# Patient Record
Sex: MALE | Race: WHITE | NOT HISPANIC OR LATINO | Employment: OTHER | URBAN - METROPOLITAN AREA
[De-identification: names, ages, dates, MRNs, and addresses within clinical notes are randomized per-mention and may not be internally consistent; named-entity substitution may affect disease eponyms.]

---

## 2017-09-28 ENCOUNTER — TELEPHONE (OUTPATIENT)
Dept: NEUROLOGY | Facility: CLINIC | Age: 77
End: 2017-09-28

## 2017-09-28 NOTE — TELEPHONE ENCOUNTER
----- Message from Theodora Knutson sent at 9/28/2017 12:54 PM CDT -----  Contact: Alberta (wife) @ [phone]  They saw Dr Arvizu at a support group at [facility] and Dr Arvizu suggested he come see him in clinic.  Would like to schedule a NP appt but there is nothing available.  pls call with an appt.

## 2017-09-28 NOTE — TELEPHONE ENCOUNTER
Appt accepted with Dr. Burgess during [facility] res clinic on 10/24/17 @ 2:30pm. Pt was seen by  in [facility] General support group and was advised to come to the clinic.

## 2017-10-24 ENCOUNTER — OFFICE VISIT (OUTPATIENT)
Dept: NEUROLOGY | Facility: CLINIC | Age: 77
End: 2017-10-24
Payer: MEDICARE

## 2017-10-24 VITALS
WEIGHT: 231 LBS | DIASTOLIC BLOOD PRESSURE: 68 MMHG | HEART RATE: 63 BPM | SYSTOLIC BLOOD PRESSURE: 113 MMHG | HEIGHT: 68 IN | BODY MASS INDEX: 35.01 KG/M2

## 2017-10-24 DIAGNOSIS — G20.A1 PARKINSON DISEASE: ICD-10-CM

## 2017-10-24 PROCEDURE — 99999 PR PBB SHADOW E&M-EST. PATIENT-LVL III: CPT | Mod: PBBFAC,GC,, | Performed by: PSYCHIATRY & NEUROLOGY

## 2017-10-24 PROCEDURE — 99213 OFFICE O/P EST LOW 20 MIN: CPT | Mod: PBBFAC | Performed by: PSYCHIATRY & NEUROLOGY

## 2017-10-24 PROCEDURE — 99205 OFFICE O/P NEW HI 60 MIN: CPT | Mod: S$PBB,GC,, | Performed by: PSYCHIATRY & NEUROLOGY

## 2017-10-24 RX ORDER — TAMSULOSIN HYDROCHLORIDE 0.4 MG/1
0.4 CAPSULE ORAL 2 TIMES DAILY
COMMUNITY

## 2017-10-24 RX ORDER — FINASTERIDE 5 MG/1
5 TABLET, FILM COATED ORAL NIGHTLY
COMMUNITY

## 2017-10-24 RX ORDER — RIVASTIGMINE TARTRATE 4.5 MG/1
4.5 CAPSULE ORAL 2 TIMES DAILY
Qty: 60 CAPSULE | Refills: 5 | Status: SHIPPED | OUTPATIENT
Start: 2017-10-24 | End: 2018-04-24 | Stop reason: SDUPTHER

## 2017-10-24 RX ORDER — RIVASTIGMINE TARTRATE 4.5 MG/1
4.5 CAPSULE ORAL 2 TIMES DAILY
COMMUNITY
End: 2017-10-24 | Stop reason: SDUPTHER

## 2017-10-24 RX ORDER — METOPROLOL SUCCINATE 25 MG/1
25 TABLET, EXTENDED RELEASE ORAL DAILY
COMMUNITY

## 2017-10-24 RX ORDER — OXYCODONE HCL 10 MG/1
10 TABLET, FILM COATED, EXTENDED RELEASE ORAL EVERY 12 HOURS PRN
COMMUNITY
End: 2018-03-08

## 2017-10-24 RX ORDER — LISINOPRIL 2.5 MG/1
2.5 TABLET ORAL DAILY
COMMUNITY
End: 2019-12-16 | Stop reason: ALTCHOICE

## 2017-10-24 RX ORDER — ALLOPURINOL 300 MG/1
300 TABLET ORAL DAILY
COMMUNITY
End: 2018-03-08 | Stop reason: SDUPTHER

## 2017-10-24 RX ORDER — OMEPRAZOLE 20 MG/1
20 CAPSULE, DELAYED RELEASE ORAL DAILY
COMMUNITY

## 2017-10-24 RX ORDER — ASPIRIN 81 MG/1
81 TABLET ORAL DAILY
COMMUNITY

## 2017-10-24 RX ORDER — SIMVASTATIN 80 MG/1
80 TABLET, FILM COATED ORAL NIGHTLY
COMMUNITY

## 2017-10-24 RX ORDER — VENLAFAXINE 75 MG/1
75 TABLET ORAL DAILY
COMMUNITY
End: 2017-10-24 | Stop reason: SDUPTHER

## 2017-10-24 RX ORDER — CARBIDOPA AND LEVODOPA 25; 100 MG/1; MG/1
1 TABLET ORAL 3 TIMES DAILY
COMMUNITY
End: 2017-10-24

## 2017-10-24 RX ORDER — ISOSORBIDE MONONITRATE 30 MG/1
30 TABLET, EXTENDED RELEASE ORAL 2 TIMES DAILY
COMMUNITY

## 2017-10-24 RX ORDER — CARBIDOPA, LEVODOPA AND ENTACAPONE 25; 200; 100 MG/1; MG/1; MG/1
1 TABLET, FILM COATED ORAL 3 TIMES DAILY
Qty: 90 TABLET | Refills: 11 | Status: SHIPPED | OUTPATIENT
Start: 2017-10-24 | End: 2018-06-08 | Stop reason: DRUGHIGH

## 2017-10-24 RX ORDER — OXYBUTYNIN CHLORIDE 10 MG/1
10 TABLET, EXTENDED RELEASE ORAL NIGHTLY
COMMUNITY
End: 2018-03-08

## 2017-10-24 RX ORDER — METFORMIN HYDROCHLORIDE 500 MG/1
1000 TABLET ORAL NIGHTLY
COMMUNITY
End: 2018-03-08

## 2017-10-24 RX ORDER — MEMANTINE HYDROCHLORIDE 10 MG/1
5 TABLET ORAL 2 TIMES DAILY
COMMUNITY
End: 2018-01-24 | Stop reason: SDUPTHER

## 2017-10-24 RX ORDER — CLOPIDOGREL BISULFATE 75 MG/1
75 TABLET ORAL DAILY
COMMUNITY
End: 2018-03-08

## 2017-10-24 RX ORDER — VENLAFAXINE 75 MG/1
75 TABLET ORAL DAILY
Qty: 30 TABLET | Refills: 5 | Status: SHIPPED | OUTPATIENT
Start: 2017-10-24 | End: 2018-04-24 | Stop reason: SDUPTHER

## 2017-10-24 NOTE — PROGRESS NOTES
"Patient Name: Danelle Frankel  MRN: [id number]    CC: PD mgmt    HPI: Danelle Frankel is a 77 y.o. male with PMHx of PD (CD/LD) diagnosed 8 years ago, DM, HTN, CAD s/p CABG 20 yrs ago, urinary incontinence (ditropan), peripheral neuropathy (effexor), presents to movement disorders clinic due to further management of parkinson's disease. Patient started with RUE resting tremor. The tremor has never really gotten worse since diagnosis. Patient was initially started on azilect 1 mg daily with CD/LD added on. Due to loss of health insurance, had to be switched to monotherapy with CD/LD. Neurologist tried going up on dose twice, but patient started hallucinating. Patient currently on CD/LD 25/100 TID for the past 4+ years. Patient currently on Neupro patch (rotigotine) for the past few years. Patient accompanied by wife who states that patient has been "slower". Patient has had a couple of falls- most recently last year. Patient has been having vivid visual hallucinations for the past 2 years. This is reported as once every few months. Patient also used to be on entacapone TID for the past few years (taken with CD/LD) up until last month when prices went up. Wife states he was doing better on entacapone.     Premotor sx's- +anosmia, + constipation, + REM behavior issues- acting out dreams, vivid dreams.     Patient of Dr. Arriaga.       ROS:   Review of Systems   Constitutional: Negative for malaise/fatigue. Negative for weight loss.   HENT: Negative for hearing loss.   Eyes: Negative for blurred vision and double vision.   Respiratory: Negative for shortness of breath and stridor.   Cardiovascular: Negative for chest pain and palpitations.   Gastrointestinal: Negative for nausea, vomiting and constipation.   Genitourinary: Negative for frequency. Negative for urgency.   Musculoskeletal: Negative for joint pain. Negative for myalgias and falls.   Skin: Negative for rash.   Neurological: Negative for dizziness and + for " resting tremors. Negative for focal weakness and seizures.   Endo/Heme/Allergies: Does not bruise/bleed easily.   Psychiatric/Behavioral: Negative for memory loss. Negative for depression and hallucinations. The patient is not nervous/anxious.      Past Medical History  No past medical history on file.    Medications    Current Outpatient Prescriptions:     allopurinol (ZYLOPRIM) 300 MG tablet, Take 300 mg by mouth once daily., Disp: , Rfl:     aspirin (ECOTRIN) 81 MG EC tablet, Take 81 mg by mouth once daily., Disp: , Rfl:     carbidopa-levodopa  mg (SINEMET)  mg per tablet, Take 1 tablet by mouth 3 (three) times daily., Disp: , Rfl:     clopidogrel (PLAVIX) 75 mg tablet, Take 75 mg by mouth once daily., Disp: , Rfl:     finasteride (PROSCAR) 5 mg tablet, Take 5 mg by mouth once daily., Disp: , Rfl:     isosorbide mononitrate (IMDUR) 30 MG 24 hr tablet, Take 30 mg by mouth 2 (two) times daily., Disp: , Rfl:     lisinopril (PRINIVIL,ZESTRIL) 2.5 MG tablet, Take 2.5 mg by mouth once daily., Disp: , Rfl:     memantine (NAMENDA) 10 MG Tab, Take 5 mg by mouth 2 (two) times daily., Disp: , Rfl:     metFORMIN (GLUCOPHAGE) 500 MG tablet, Take 1,000 mg by mouth every evening., Disp: , Rfl:     metoprolol succinate (TOPROL-XL) 25 MG 24 hr tablet, Take 25 mg by mouth once daily., Disp: , Rfl:     omeprazole (PRILOSEC) 20 MG capsule, Take 20 mg by mouth once daily., Disp: , Rfl:     oxybutynin (DITROPAN-XL) 10 MG 24 hr tablet, Take 10 mg by mouth every evening., Disp: , Rfl:     oxyCODONE (OXYCONTIN) 10 mg 12 hr tablet, Take 10 mg by mouth every 12 (twelve) hours as needed for Pain., Disp: , Rfl:     rivastigmine tartrate 4.5 mg capsule, Take 4.5 mg by mouth 2 (two) times daily., Disp: , Rfl:     rotigotine (NEUPRO) 8 mg/24 hour PT24, Place onto the skin., Disp: , Rfl:     simvastatin (ZOCOR) 80 MG tablet, Take 80 mg by mouth every evening., Disp: , Rfl:     tamsulosin (FLOMAX) 0.4 mg Cp24, Take  "0.4 mg by mouth once daily., Disp: , Rfl:     venlafaxine (EFFEXOR) 75 MG tablet, Take 75 mg by mouth once daily., Disp: , Rfl:     Allergies  Review of patient's allergies indicates:  No Known Allergies    Social History  Social History     Social History    Marital status:      Spouse name: N/A    Number of children: N/A    Years of education: N/A     Occupational History    Not on file.     Social History Main Topics    Smoking status: Not on file    Smokeless tobacco: Not on file    Alcohol use Not on file    Drug use: Unknown    Sexual activity: Not on file     Other Topics Concern    Not on file     Social History Narrative    No narrative on file       Family History  No family history on file.    Physical Exam  /68 (BP Location: Left arm, Patient Position: Sitting, BP Method: X-Large (Automatic))   Pulse 63   Ht 5' 8" (1.727 m)   Wt 104.8 kg (231 lb)   BMI 35.12 kg/m²     General appearance: Well-developed, well-groomed. In wheelchair due to long walk into clinic.    Neurologic Exam: The patient is awake, alert and oriented. Language is fluent. Recent and remote memory are normal. Attention span and concentration are normal. Fund of knowledge is appropriate.     Cranial nerves: pupils are round and reactive to light and accommodation. Visual fields are full to confrontation. Ocular motility is full in all cardinal positions of gaze. Facial sensation is normal to pinprick and light touch. Corneal reflexes are present bilaterally. Facial activation is symmetric. Hearing is normal bilaterally. Palate elevates symmetrically and gag reflex is intact bilaterally. Shoulder elevation is symmetric and full strength bilaterally. Tongue is midline and neck rotation strength is normal bilaterally. Neck range of motion is normal.     Motor examination of all extremities demonstrates normal bulk and tone in all four limbs. There are no atrophy or fasciculations. Strength is 5/5 in the upper " and lower extremities bilaterally without pronator drift.   RUE resting tremor  Cogwheel rigidity in RUE>LUE  Masked facies  Decreased blink rate    Sensory examination is normal to pinprick, vibration and proprioception in the upper and lower extremities bilaterally. Romberg is negative.    Deep tendon reflexes are 2+ and symmetric in the upper and lower extremities bilaterally. Toes are mute bilaterally.     Gait: Decreased R arm swing with short choppy shuffling gait, multiple steps en bloc turn  +retropulsion    Coordination: Finger to nose and heel to shin testing is normal in both upper and lower extremities. Rapid alternating movements are normal in both upper and lower extremities.     General exam  Cardiovascular: regular rate and rhythm with no murmurs, rubs or gallops. There are no carotid or vertebral artery bruits. Pulses in both upper and lower extremities are symmetric. There is no peripheral edema.   Head and neck: no cervical lymphadenopathy            Lab and Test Results    No results found for: WBC, HGB, HCT, PLT  No results found for: GLU, NA, K, CL, CO2, BUN, CREATININE, CALCIUM, MG, PHOS  No results found for: ALB, ALKPHOS, ALT, AST      Images: none    Independently reviewed NO    Other Tests    Assessment and Plan    Danelle Frankel is a 77 y.o. male with 8 year history of PD- tremor predominant. Patient has had same severity of tremor throughout course of 8 years. Was on entacapone in the past , tremors less, recently had to stop taking it (1 month ago) due to increase in price, so tremor has increased. Will write patient for stalevo generic (CD/LD/entacapone). If price too high, will retry entacapone as separate medication to be taken with each dose of CD/LD as prior.     Continue Neupro patch at current dose  Stalevo 25/100/200 TID  Consider comptan separately with each dose of CD/LD as before if price of stalevo too high              Lisseth Burgess MD  Neurology Resident    [facility]  [address] [place] [address]  Pager: [phone]

## 2017-10-26 PROBLEM — G20.A1 PARKINSON DISEASE: Status: ACTIVE | Noted: 2017-10-26

## 2017-11-29 ENCOUNTER — PATIENT MESSAGE (OUTPATIENT)
Dept: NEUROLOGY | Facility: CLINIC | Age: 77
End: 2017-11-29

## 2018-01-24 ENCOUNTER — OFFICE VISIT (OUTPATIENT)
Dept: NEUROLOGY | Facility: CLINIC | Age: 78
End: 2018-01-24
Payer: MEDICARE

## 2018-01-24 ENCOUNTER — LAB VISIT (OUTPATIENT)
Dept: LAB | Facility: HOSPITAL | Age: 78
End: 2018-01-24
Payer: MEDICARE

## 2018-01-24 VITALS
WEIGHT: 229.5 LBS | HEIGHT: 68 IN | SYSTOLIC BLOOD PRESSURE: 122 MMHG | HEART RATE: 64 BPM | DIASTOLIC BLOOD PRESSURE: 71 MMHG | BODY MASS INDEX: 34.78 KG/M2

## 2018-01-24 DIAGNOSIS — R35.0 URINARY FREQUENCY: ICD-10-CM

## 2018-01-24 DIAGNOSIS — R44.3 HALLUCINATION: ICD-10-CM

## 2018-01-24 DIAGNOSIS — G20.A1 PARKINSON DISEASE: ICD-10-CM

## 2018-01-24 DIAGNOSIS — R44.3 HALLUCINATIONS: ICD-10-CM

## 2018-01-24 DIAGNOSIS — G20.A1 PARKINSON DISEASE: Primary | ICD-10-CM

## 2018-01-24 LAB
BACTERIA #/AREA URNS AUTO: NORMAL /HPF
BILIRUB UR QL STRIP: NEGATIVE
CLARITY UR REFRACT.AUTO: ABNORMAL
COLOR UR AUTO: ABNORMAL
GLUCOSE UR QL STRIP: ABNORMAL
HGB UR QL STRIP: NEGATIVE
HYALINE CASTS UR QL AUTO: 0 /LPF
KETONES UR QL STRIP: ABNORMAL
LEUKOCYTE ESTERASE UR QL STRIP: NEGATIVE
MICROSCOPIC COMMENT: NORMAL
NITRITE UR QL STRIP: NEGATIVE
PH UR STRIP: 5 [PH] (ref 5–8)
PROT UR QL STRIP: ABNORMAL
RBC #/AREA URNS AUTO: 1 /HPF (ref 0–4)
SP GR UR STRIP: 1.03 (ref 1–1.03)
URN SPEC COLLECT METH UR: ABNORMAL
UROBILINOGEN UR STRIP-ACNC: NEGATIVE EU/DL
WBC #/AREA URNS AUTO: 5 /HPF (ref 0–5)

## 2018-01-24 PROCEDURE — 99214 OFFICE O/P EST MOD 30 MIN: CPT | Mod: S$PBB,GC,, | Performed by: PSYCHIATRY & NEUROLOGY

## 2018-01-24 PROCEDURE — 81001 URINALYSIS AUTO W/SCOPE: CPT

## 2018-01-24 PROCEDURE — 99214 OFFICE O/P EST MOD 30 MIN: CPT | Mod: PBBFAC | Performed by: PSYCHIATRY & NEUROLOGY

## 2018-01-24 PROCEDURE — 99999 PR PBB SHADOW E&M-EST. PATIENT-LVL IV: CPT | Mod: PBBFAC,GC,, | Performed by: PSYCHIATRY & NEUROLOGY

## 2018-01-24 RX ORDER — MEMANTINE HYDROCHLORIDE 10 MG/1
10 TABLET ORAL 2 TIMES DAILY
Qty: 60 TABLET | Refills: 6 | Status: SHIPPED | OUTPATIENT
Start: 2018-01-24 | End: 2018-09-19 | Stop reason: SDUPTHER

## 2018-01-24 NOTE — PROGRESS NOTES
"Patient Name: Danelle Frankel  MRN: [id number]    CC: PD mgmt    Interval Hx- Patient having episodes of talking to someone else, possible hallucinations. Has happened twice. Happening once or twice a week. We had decreased noon dose of stalevo to half  A pill. Wife eliminated noon dose since December. Neupro patch- patient's wife decrease changing to every 3 days instead of every day. Last night he had an episode where he was laughing, looking down the galloway and seeing dead puppy. He has not had an episdoe like this since November.     HPI: Danelle Frankel is a 77 y.o. male with PMHx of PD (CD/LD) diagnosed 8 years ago, DM, HTN, CAD s/p CABG 20 yrs ago, urinary incontinence (ditropan), peripheral neuropathy (effexor), presents to movement disorders clinic due to further management of parkinson's disease. Patient started with RUE resting tremor. The tremor has never really gotten worse since diagnosis. Patient was initially started on azilect 1 mg daily with CD/LD added on. Due to loss of health insurance, had to be switched to monotherapy with CD/LD. Neurologist tried going up on dose twice, but patient started hallucinating. Patient currently on CD/LD 25/100 TID for the past 4+ years. Patient currently on Neupro patch (rotigotine) for the past few years. Patient accompanied by wife who states that patient has been "slower". Patient has had a couple of falls- most recently last year. Patient has been having vivid visual hallucinations for the past 2 years. This is reported as once every few months. Patient also used to be on entacapone TID for the past few years (taken with CD/LD) up until last month when prices went up. Wife states he was doing better on entacapone.     Premotor sx's- +anosmia, + constipation, + REM behavior issues- acting out dreams, vivid dreams.     Patient of Dr. Arriaga.       ROS:   Review of Systems   Constitutional: Negative for malaise/fatigue. Negative for weight loss.   HENT: Negative " for hearing loss.   Eyes: Negative for blurred vision and double vision.   Respiratory: Negative for shortness of breath and stridor.   Cardiovascular: Negative for chest pain and palpitations.   Gastrointestinal: Negative for nausea, vomiting and constipation.   Genitourinary: Negative for frequency. Negative for urgency.   Musculoskeletal: Negative for joint pain. Negative for myalgias and falls.   Skin: Negative for rash.   Neurological: Negative for dizziness and + for resting tremors. Negative for focal weakness and seizures.   Endo/Heme/Allergies: Does not bruise/bleed easily.   Psychiatric/Behavioral: Negative for memory loss. Negative for depression and hallucinations. The patient is not nervous/anxious.      Past Medical History  No past medical history on file.    Medications    Current Outpatient Prescriptions:     allopurinol (ZYLOPRIM) 300 MG tablet, Take 300 mg by mouth once daily., Disp: , Rfl:     aspirin (ECOTRIN) 81 MG EC tablet, Take 81 mg by mouth once daily., Disp: , Rfl:     carbidopa-levodopa-entacapone -200 mg (STALEVO) -200 mg Tab, Take 1 tablet by mouth 3 (three) times daily., Disp: 90 tablet, Rfl: 11    clopidogrel (PLAVIX) 75 mg tablet, Take 75 mg by mouth once daily., Disp: , Rfl:     finasteride (PROSCAR) 5 mg tablet, Take 5 mg by mouth once daily., Disp: , Rfl:     isosorbide mononitrate (IMDUR) 30 MG 24 hr tablet, Take 30 mg by mouth 2 (two) times daily., Disp: , Rfl:     lisinopril (PRINIVIL,ZESTRIL) 2.5 MG tablet, Take 2.5 mg by mouth once daily., Disp: , Rfl:     memantine (NAMENDA) 10 MG Tab, Take 5 mg by mouth 2 (two) times daily., Disp: , Rfl:     metFORMIN (GLUCOPHAGE) 500 MG tablet, Take 1,000 mg by mouth every evening., Disp: , Rfl:     metoprolol succinate (TOPROL-XL) 25 MG 24 hr tablet, Take 25 mg by mouth once daily., Disp: , Rfl:     omeprazole (PRILOSEC) 20 MG capsule, Take 20 mg by mouth once daily., Disp: , Rfl:     oxybutynin (DITROPAN-XL) 10  "MG 24 hr tablet, Take 10 mg by mouth every evening., Disp: , Rfl:     oxyCODONE (OXYCONTIN) 10 mg 12 hr tablet, Take 10 mg by mouth every 12 (twelve) hours as needed for Pain., Disp: , Rfl:     rivastigmine tartrate 4.5 mg capsule, Take 1 capsule (4.5 mg total) by mouth 2 (two) times daily., Disp: 60 capsule, Rfl: 5    rotigotine (NEUPRO) 8 mg/24 hour PT24, Place onto the skin., Disp: , Rfl:     simvastatin (ZOCOR) 80 MG tablet, Take 80 mg by mouth every evening., Disp: , Rfl:     tamsulosin (FLOMAX) 0.4 mg Cp24, Take 0.4 mg by mouth once daily., Disp: , Rfl:     venlafaxine (EFFEXOR) 75 MG tablet, Take 1 tablet (75 mg total) by mouth once daily., Disp: 30 tablet, Rfl: 5    Allergies  Review of patient's allergies indicates:  No Known Allergies    Social History  Social History     Social History    Marital status:      Spouse name: N/A    Number of children: N/A    Years of education: N/A     Occupational History    Not on file.     Social History Main Topics    Smoking status: Not on file    Smokeless tobacco: Not on file    Alcohol use Not on file    Drug use: Unknown    Sexual activity: Not on file     Other Topics Concern    Not on file     Social History Narrative    No narrative on file       Family History  No family history on file.    Physical Exam  /71   Pulse 64   Ht 5' 8" (1.727 m)   Wt 104.1 kg (229 lb 8 oz)   BMI 34.90 kg/m²     General appearance: Well-developed, well-groomed. In wheelchair due to long walk into clinic.    Neurologic Exam: The patient is awake, alert and oriented. Language is fluent. Recent and remote memory are normal. Attention span and concentration are normal. Fund of knowledge is appropriate.     Cranial nerves: pupils are round and reactive to light and accommodation. Visual fields are full to confrontation. Ocular motility is full in all cardinal positions of gaze. Facial sensation is normal to pinprick and light touch. Corneal reflexes are " present bilaterally. Facial activation is symmetric. Hearing is normal bilaterally. Palate elevates symmetrically and gag reflex is intact bilaterally. Shoulder elevation is symmetric and full strength bilaterally. Tongue is midline and neck rotation strength is normal bilaterally. Neck range of motion is normal.     Motor examination of all extremities demonstrates normal bulk and tone in all four limbs. There are no atrophy or fasciculations. Strength is 5/5 in the upper and lower extremities bilaterally without pronator drift.   RUE resting tremor  Cogwheel rigidity in RUE>LUE  Masked facies  Decreased blink rate    Sensory examination is normal to pinprick, vibration and proprioception in the upper and lower extremities bilaterally. Romberg is negative.    Deep tendon reflexes are 2+ and symmetric in the upper and lower extremities bilaterally. Toes are mute bilaterally.     Gait: Decreased R arm swing with short choppy shuffling gait, multiple steps en bloc turn  +retropulsion    Coordination: Finger to nose and heel to shin testing is normal in both upper and lower extremities. Rapid alternating movements are normal in both upper and lower extremities.     General exam  Cardiovascular: regular rate and rhythm with no murmurs, rubs or gallops. There are no carotid or vertebral artery bruits. Pulses in both upper and lower extremities are symmetric. There is no peripheral edema.   Head and neck: no cervical lymphadenopathy            Lab and Test Results    No results found for: WBC, HGB, HCT, PLT  No results found for: GLU, NA, K, CL, CO2, BUN, CREATININE, CALCIUM, MG, PHOS  No results found for: ALB, ALKPHOS, ALT, AST      Images: none    Independently reviewed NO    Other Tests    Assessment and Plan    Problem List Items Addressed This Visit        Neuro    Parkinson disease - Primary    Overview     Patient with Parkinson's on stalevo 25/100/200 TID and neupro patch for RLS. Started having visual  hallucinations in November, decreased stalevo to BID. Got much better. Had episode on night of 18 where he had VH and was talking to somebody who was not there. Also seeing  cat.         Current Assessment & Plan     Cut Neupro patch in half and use just like she is now- every 3 days  Stalevo 25/100/200 BID  Continue memantine and rivastigmine at current doses  Consider comptan separately with each dose of CD/LD as before if price of stalevo too high  Patient on ditropan (anticholinergic)- consider decreasing if hallucinations do not stop or increase.   R/o infection- CBC, U/A (normal, no indication of infectious process driving VH)           Relevant Orders    Urinalysis (Completed)    CULTURE, URINE    CBC auto differential (Completed)       Renal/    Urinary frequency    Current Assessment & Plan     U/A (no infection)  CBC         Relevant Medications    memantine (NAMENDA) 10 MG Tab    Other Relevant Orders    Urinalysis (Completed)    CULTURE, URINE       Other    Hallucinations    Current Assessment & Plan     R/O infection  Decrease dopamine agonist (neurpr patch in half q3 days)  Continue stalevo BID             Other Visit Diagnoses     Hallucination        Relevant Orders    Urinalysis (Completed)    CULTURE, URINE            RTC 6 weeks        Lisseth Burgess MD  Neurology Resident   [facility] [address] [place] [address]  Pager: [phone]

## 2018-01-25 PROBLEM — R35.0 URINARY FREQUENCY: Status: ACTIVE | Noted: 2018-01-25

## 2018-01-25 PROBLEM — R44.3 HALLUCINATIONS: Status: ACTIVE | Noted: 2018-01-25

## 2018-01-25 NOTE — ASSESSMENT & PLAN NOTE
Cut Neupro patch in half and use just like she is now- every 3 days  Stalevo 25/100/200 BID  Continue memantine and rivastigmine at current doses  Consider comptan separately with each dose of CD/LD as before if price of stalevo too high  Patient on ditropan (anticholinergic)- consider decreasing if hallucinations do not stop or increase.   R/o infection- CBC, U/A (normal, no indication of infectious process driving VH)

## 2018-03-08 PROBLEM — G89.29 CHRONIC LOW BACK PAIN WITHOUT SCIATICA: Status: ACTIVE | Noted: 2018-03-08

## 2018-03-08 PROBLEM — M54.50 CHRONIC LOW BACK PAIN WITHOUT SCIATICA: Status: ACTIVE | Noted: 2018-03-08

## 2018-03-13 ENCOUNTER — OFFICE VISIT (OUTPATIENT)
Dept: NEUROLOGY | Facility: CLINIC | Age: 78
End: 2018-03-13
Payer: MEDICARE

## 2018-03-13 VITALS — DIASTOLIC BLOOD PRESSURE: 71 MMHG | HEIGHT: 68 IN | HEART RATE: 62 BPM | SYSTOLIC BLOOD PRESSURE: 111 MMHG

## 2018-03-13 DIAGNOSIS — G20.A1 PARKINSON DISEASE: ICD-10-CM

## 2018-03-13 PROCEDURE — 99214 OFFICE O/P EST MOD 30 MIN: CPT | Mod: PBBFAC | Performed by: PSYCHIATRY & NEUROLOGY

## 2018-03-13 PROCEDURE — 99999 PR PBB SHADOW E&M-EST. PATIENT-LVL IV: CPT | Mod: PBBFAC,GC,, | Performed by: PSYCHIATRY & NEUROLOGY

## 2018-03-13 PROCEDURE — 99214 OFFICE O/P EST MOD 30 MIN: CPT | Mod: S$PBB,GC,, | Performed by: PSYCHIATRY & NEUROLOGY

## 2018-03-13 NOTE — PROGRESS NOTES
"Patient Name: Danelle Frankel  MRN: [id number]    CC: PD mgmt    Interval Hx- Patient accompanied by wife, states VH decreased. Increasingly bradykinetic and rigid. Has trouble getting up and repositioning. Feels like he is more rigid since eliminating afternoon dose of stalevo (VH).     Interval Hx- Patient having episodes of talking to someone else, possible hallucinations. Has happened twice. Happening once or twice a week. We had decreased noon dose of stalevo to half  A pill. Wife eliminated noon dose since December. Neupro patch- patient's wife decrease changing to every 3 days instead of every day. Last night he had an episode where he was laughing, looking down the galloway and seeing dead puppy. He has not had an episdoe like this since November.     HPI: Danelle Frankel is a 77 y.o. male with PMHx of PD (CD/LD) diagnosed 8 years ago, DM, HTN, CAD s/p CABG 20 yrs ago, urinary incontinence (ditropan), peripheral neuropathy (effexor), presents to movement disorders clinic due to further management of parkinson's disease. Patient started with RUE resting tremor. The tremor has never really gotten worse since diagnosis. Patient was initially started on azilect 1 mg daily with CD/LD added on. Due to loss of health insurance, had to be switched to monotherapy with CD/LD. Neurologist tried going up on dose twice, but patient started hallucinating. Patient currently on CD/LD 25/100 TID for the past 4+ years. Patient currently on Neupro patch (rotigotine) for the past few years. Patient accompanied by wife who states that patient has been "slower". Patient has had a couple of falls- most recently last year. Patient has been having vivid visual hallucinations for the past 2 years. This is reported as once every few months. Patient also used to be on entacapone TID for the past few years (taken with CD/LD) up until last month when prices went up. Wife states he was doing better on entacapone.     Premotor sx's- +anosmia, " + constipation, + REM behavior issues- acting out dreams, vivid dreams.     Patient of Dr. Arriaga.       ROS:   Review of Systems   Constitutional: Negative for malaise/fatigue. Negative for weight loss.   HENT: Negative for hearing loss.   Eyes: Negative for blurred vision and double vision.   Respiratory: Negative for shortness of breath and stridor.   Cardiovascular: Negative for chest pain and palpitations.   Gastrointestinal: Negative for nausea, vomiting and constipation.   Genitourinary: Negative for frequency. Negative for urgency.   Musculoskeletal: Negative for joint pain. Negative for myalgias and falls.   Skin: Negative for rash.   Neurological: Negative for dizziness and + for resting tremors. Negative for focal weakness and seizures.   Endo/Heme/Allergies: Does not bruise/bleed easily.   Psychiatric/Behavioral: Negative for memory loss. Negative for depression and hallucinations. The patient is not nervous/anxious.      Past Medical History  Past Medical History:   Diagnosis Date    Cataract     Coronary artery disease     Dementia     Diabetes mellitus, type 2     Dyslipidemia     GERD (gastroesophageal reflux disease)     Gout     Hyperlipidemia     Hypertension     Parkinson disease     Venous insufficiency        Medications    Current Outpatient Prescriptions:     allopurinol (ZYLOPRIM) 300 MG tablet, Take 1 tablet (300 mg total) by mouth once daily., Disp: 90 tablet, Rfl: 3    aspirin (ECOTRIN) 81 MG EC tablet, Take 81 mg by mouth once daily., Disp: , Rfl:     carbidopa-levodopa-entacapone -200 mg (STALEVO) -200 mg Tab, Take 1 tablet by mouth 3 (three) times daily., Disp: 90 tablet, Rfl: 11    finasteride (PROSCAR) 5 mg tablet, Take 5 mg by mouth once daily., Disp: , Rfl:     isosorbide mononitrate (IMDUR) 30 MG 24 hr tablet, Take 30 mg by mouth 2 (two) times daily., Disp: , Rfl:     lisinopril (PRINIVIL,ZESTRIL) 2.5 MG tablet, Take 2.5 mg by mouth once daily., Disp:  ", Rfl:     memantine (NAMENDA) 10 MG Tab, Take 1 tablet (10 mg total) by mouth 2 (two) times daily., Disp: 60 tablet, Rfl: 6    metFORMIN (GLUCOPHAGE-XR) 500 MG 24 hr tablet, Take 2 tablets by mouth once daily., Disp: , Rfl:     metoprolol succinate (TOPROL-XL) 25 MG 24 hr tablet, Take 25 mg by mouth once daily., Disp: , Rfl:     omeprazole (PRILOSEC) 20 MG capsule, Take 20 mg by mouth once daily., Disp: , Rfl:     rivastigmine tartrate 4.5 mg capsule, Take 1 capsule (4.5 mg total) by mouth 2 (two) times daily., Disp: 60 capsule, Rfl: 5    rotigotine (NEUPRO) 8 mg/24 hour PT24, Place 4 mg onto the skin every other day. Cut patch in half and use every other day to every 3 days, Disp: , Rfl:     simvastatin (ZOCOR) 80 MG tablet, Take 80 mg by mouth every evening., Disp: , Rfl:     tamsulosin (FLOMAX) 0.4 mg Cp24, Take 0.4 mg by mouth once daily., Disp: , Rfl:     tiZANidine (ZANAFLEX) 4 MG tablet, Take 1 tablet (4 mg total) by mouth every 6 (six) hours as needed., Disp: 30 tablet, Rfl: 0    venlafaxine (EFFEXOR) 75 MG tablet, Take 1 tablet (75 mg total) by mouth once daily., Disp: 30 tablet, Rfl: 5    Allergies  Review of patient's allergies indicates:  No Known Allergies    Social History  Social History     Social History    Marital status:      Spouse name: N/A    Number of children: 4    Years of education: N/A     Occupational History    Not on file.     Social History Main Topics    Smoking status: Never Smoker    Smokeless tobacco: Former User     Types: Chew    Alcohol use No    Drug use: No    Sexual activity: Not on file     Other Topics Concern    Not on file     Social History Narrative    No narrative on file       Family History  Family History   Problem Relation Age of Onset    Diabetes Mother     Cancer Mother     Cancer Father     Cancer Sister     Stroke Brother        Physical Exam  /71   Pulse 62   Ht 5' 8" (1.727 m)     General appearance: Well-developed, " well-groomed. In wheelchair due to long walk into clinic.    Neurologic Exam: The patient is awake, alert and oriented. Language is fluent. Recent and remote memory are normal. Attention span and concentration are normal. Fund of knowledge is appropriate.     Cranial nerves: pupils are round and reactive to light and accommodation. Visual fields are full to confrontation. Ocular motility is full in all cardinal positions of gaze. Facial sensation is normal to pinprick and light touch. Corneal reflexes are present bilaterally. Facial activation is symmetric. Hearing is normal bilaterally. Palate elevates symmetrically and gag reflex is intact bilaterally. Shoulder elevation is symmetric and full strength bilaterally. Tongue is midline and neck rotation strength is normal bilaterally. Neck range of motion is normal.     Motor examination of all extremities demonstrates normal bulk and tone in all four limbs. There are no atrophy or fasciculations. Strength is 5/5 in the upper and lower extremities bilaterally without pronator drift.   RUE resting tremor- increased since last visit  Cogwheel rigidity in RUE>LUE  Masked facies  Decreased blink rate    Sensory examination is normal to pinprick, vibration and proprioception in the upper and lower extremities bilaterally. Romberg is negative.    Deep tendon reflexes are 2+ and symmetric in the upper and lower extremities bilaterally. Toes are mute bilaterally.     Gait: Decreased R arm swing with short choppy shuffling gait, multiple steps en bloc turn  +retropulsion  Inc bradykinetic with choppy steps  Requires increased effort to get out of wheelchair    Coordination: Finger to nose and heel to shin testing is normal in both upper and lower extremities. Rapid alternating movements are normal in both upper and lower extremities.     General exam  Cardiovascular: regular rate and rhythm with no murmurs, rubs or gallops. There are no carotid or vertebral artery bruits.  Pulses in both upper and lower extremities are symmetric. There is no peripheral edema.   Head and neck: no cervical lymphadenopathy            Lab and Test Results    WBC   Date Value Ref Range Status   2018 10.95 3.90 - 12.70 K/uL Final     Hemoglobin   Date Value Ref Range Status   2018 12.8 (L) 14.0 - 18.0 g/dL Final     Hematocrit   Date Value Ref Range Status   2018 38.9 (L) 40.0 - 54.0 % Final     Platelets   Date Value Ref Range Status   2018 225 150 - 350 K/uL Final     No results found for: GLU, NA, K, CL, CO2, BUN, CREATININE, CALCIUM, MG, PHOS  No results found for: ALB, ALKPHOS, ALT, AST      Images: none    Independently reviewed NO    Other Tests    Assessment and Plan    Problem List Items Addressed This Visit     None        Problem List Items Addressed This Visit        Neuro    Parkinson disease    Overview     Patient with Parkinson's on stalevo 25/100/200 TID and neupro patch for RLS. Started having visual hallucinations in November, decreased stalevo to BID. Got much better. Had episode on night of 18 where he had VH and was talking to somebody who was not there. Also seeing  cat.         Current Assessment & Plan     Decrease Neurpro patch as currently using (1/2 patch) daily (2 mg)  Inc Stalevo 25/100/200 to TID  Continue memantine and rivastigmine at current doses  Consider comptan separately with each dose of CD/LD as before if price of stalevo too high  Patient recently had ditropan (anticholinergic) discontinued by other doctor.  PT                 RTC 2-4 weeks weeks        Lisseth Burgess MD  Neurology Resident   [facility] [address] [place] [address]  Pager: [phone]

## 2018-03-13 NOTE — ASSESSMENT & PLAN NOTE
Decrease Neurpro patch as currently using (1/2 patch) daily (2 mg)  Inc Stalevo 25/100/200 to TID  Continue memantine and rivastigmine at current doses  Consider comptan separately with each dose of CD/LD as before if price of stalevo too high  Patient recently had ditropan (anticholinergic) discontinued by other doctor.  PT

## 2018-04-12 RX ORDER — VENLAFAXINE 75 MG/1
75 TABLET ORAL DAILY
Qty: 30 TABLET | Refills: 5 | OUTPATIENT
Start: 2018-04-12

## 2018-04-24 RX ORDER — VENLAFAXINE 75 MG/1
75 TABLET ORAL DAILY
Qty: 30 TABLET | Refills: 1 | Status: SHIPPED | OUTPATIENT
Start: 2018-04-24 | End: 2018-06-29 | Stop reason: SDUPTHER

## 2018-04-24 RX ORDER — RIVASTIGMINE TARTRATE 4.5 MG/1
4.5 CAPSULE ORAL 2 TIMES DAILY
Qty: 60 CAPSULE | Refills: 5 | Status: SHIPPED | OUTPATIENT
Start: 2018-04-24 | End: 2018-09-19 | Stop reason: SDUPTHER

## 2018-04-24 NOTE — TELEPHONE ENCOUNTER
Rivastigmine was refilled. Dr. Burgess declined refill on the venlafaxine on 4/11/18, but it's unclear why. I will fill for 1 month, then defer back to Dr. Burgess.

## 2018-04-24 NOTE — TELEPHONE ENCOUNTER
----- Message from Tyshawn Simons sent at 4/23/2018 11:50 AM CDT -----  Contact: [facility] @ [phone]  [facility] is calling for refill on rivastigmine tartrate 4.5 mg capsule and venlafaxine (EFFEXOR) 75 MG tablet. Pls call.

## 2018-04-27 ENCOUNTER — PATIENT MESSAGE (OUTPATIENT)
Dept: NEUROLOGY | Facility: CLINIC | Age: 78
End: 2018-04-27

## 2018-04-27 ENCOUNTER — TELEPHONE (OUTPATIENT)
Dept: NEUROLOGY | Facility: CLINIC | Age: 78
End: 2018-04-27

## 2018-04-27 NOTE — TELEPHONE ENCOUNTER
Appt made for 5/22/18 @ 1:30pm with Dr. Burgess(DH to staff in Movement res clinic) Appt viewable in portal.

## 2018-04-27 NOTE — TELEPHONE ENCOUNTER
----- Message from Nilsa GARCIA Braydon sent at 4/27/2018  5:15 PM CDT -----  Contact: PT Portal Request  Appointment Request From: Danelle Frankel    With Provider: Lisseth Burgess MD [[facility] - Neurology]    Would Accept With:Only the person I've selected    Preferred Date Range: From 5/21/2018 To 5/25/2018    Preferred Times: Any    Reason for visit: Request an Appt    Comments:  Please schedule an appointment during mid-morning if possible. I have selected late May, unless the doctor says otherwise. Thank you for your time.

## 2018-05-22 ENCOUNTER — OFFICE VISIT (OUTPATIENT)
Dept: NEUROLOGY | Facility: CLINIC | Age: 78
End: 2018-05-22
Payer: MEDICARE

## 2018-05-22 DIAGNOSIS — G20.A1 PD (PARKINSON'S DISEASE): Primary | ICD-10-CM

## 2018-05-22 PROCEDURE — 99214 OFFICE O/P EST MOD 30 MIN: CPT | Mod: S$PBB,GC,, | Performed by: PSYCHIATRY & NEUROLOGY

## 2018-05-22 PROCEDURE — 99999 PR PBB SHADOW E&M-EST. PATIENT-LVL III: CPT | Mod: PBBFAC,GC,, | Performed by: PSYCHIATRY & NEUROLOGY

## 2018-05-22 PROCEDURE — 99213 OFFICE O/P EST LOW 20 MIN: CPT | Mod: PBBFAC | Performed by: PSYCHIATRY & NEUROLOGY

## 2018-05-22 RX ORDER — QUETIAPINE FUMARATE 25 MG/1
25 TABLET, FILM COATED ORAL NIGHTLY
Qty: 30 TABLET | Refills: 11 | Status: SHIPPED | OUTPATIENT
Start: 2018-05-22 | End: 2018-09-19

## 2018-05-22 NOTE — PROGRESS NOTES
"Patient Name: Danelle Frankel  MRN: [id number]    CC: PD mgmt    Interval Hx- Had VH, wife cut stalevo afternoon dose to half a pill. No current VH.     Interval Hx- Patient accompanied by wife, states VH decreased. Increasingly bradykinetic and rigid. Has trouble getting up and repositioning. Feels like he is more rigid since eliminating afternoon dose of stalevo (VH).     Interval Hx- Patient having episodes of talking to someone else, possible hallucinations. Has happened twice. Happening once or twice a week. We had decreased noon dose of stalevo to half  A pill. Wife eliminated noon dose since December. Neupro patch- patient's wife decrease changing to every 3 days instead of every day. Last night he had an episode where he was laughing, looking down the galloway and seeing dead puppy. He has not had an episdoe like this since November.     HPI: Danelle Frankel is a 77 y.o. male with PMHx of PD (CD/LD) diagnosed 8 years ago, DM, HTN, CAD s/p CABG 20 yrs ago, urinary incontinence (ditropan), peripheral neuropathy (effexor), presents to movement disorders clinic due to further management of parkinson's disease. Patient started with RUE resting tremor. The tremor has never really gotten worse since diagnosis. Patient was initially started on azilect 1 mg daily with CD/LD added on. Due to loss of health insurance, had to be switched to monotherapy with CD/LD. Neurologist tried going up on dose twice, but patient started hallucinating. Patient currently on CD/LD 25/100 TID for the past 4+ years. Patient currently on Neupro patch (rotigotine) for the past few years. Patient accompanied by wife who states that patient has been "slower". Patient has had a couple of falls- most recently last year. Patient has been having vivid visual hallucinations for the past 2 years. This is reported as once every few months. Patient also used to be on entacapone TID for the past few years (taken with CD/LD) up until last month when " prices went up. Wife states he was doing better on entacapone.     Premotor sx's- +anosmia, + constipation, + REM behavior issues- acting out dreams, vivid dreams.     Patient of Dr. Arriaga.       ROS:   Review of Systems   Constitutional: Negative for malaise/fatigue. Negative for weight loss.   HENT: Negative for hearing loss.   Eyes: Negative for blurred vision and double vision.   Respiratory: Negative for shortness of breath and stridor.   Cardiovascular: Negative for chest pain and palpitations.   Gastrointestinal: Negative for nausea, vomiting and constipation.   Genitourinary: Negative for frequency. Negative for urgency.   Musculoskeletal: Negative for joint pain. Negative for myalgias and falls.   Skin: Negative for rash.   Neurological: Negative for dizziness and + for resting tremors. Negative for focal weakness and seizures.   Endo/Heme/Allergies: Does not bruise/bleed easily.   Psychiatric/Behavioral: Negative for memory loss. Negative for depression and hallucinations. The patient is not nervous/anxious.      Past Medical History  Past Medical History:   Diagnosis Date    Cataract     Coronary artery disease     Dementia     Diabetes mellitus, type 2     Dyslipidemia     GERD (gastroesophageal reflux disease)     Gout     Hyperlipidemia     Hypertension     Parkinson disease     Venous insufficiency        Medications    Current Outpatient Prescriptions:     allopurinol (ZYLOPRIM) 300 MG tablet, Take 1 tablet (300 mg total) by mouth once daily., Disp: 90 tablet, Rfl: 3    aspirin (ECOTRIN) 81 MG EC tablet, Take 81 mg by mouth once daily., Disp: , Rfl:     carbidopa-levodopa-entacapone -200 mg (STALEVO) -200 mg Tab, Take 1 tablet by mouth 3 (three) times daily., Disp: 90 tablet, Rfl: 11    finasteride (PROSCAR) 5 mg tablet, Take 5 mg by mouth once daily., Disp: , Rfl:     isosorbide mononitrate (IMDUR) 30 MG 24 hr tablet, Take 30 mg by mouth 2 (two) times daily., Disp: ,  Rfl:     lisinopril (PRINIVIL,ZESTRIL) 2.5 MG tablet, Take 2.5 mg by mouth once daily., Disp: , Rfl:     memantine (NAMENDA) 10 MG Tab, Take 1 tablet (10 mg total) by mouth 2 (two) times daily., Disp: 60 tablet, Rfl: 6    metFORMIN (GLUCOPHAGE-XR) 500 MG 24 hr tablet, Take 2 tablets by mouth once daily., Disp: , Rfl:     metoprolol succinate (TOPROL-XL) 25 MG 24 hr tablet, Take 25 mg by mouth once daily., Disp: , Rfl:     omeprazole (PRILOSEC) 20 MG capsule, Take 20 mg by mouth once daily., Disp: , Rfl:     rivastigmine tartrate 4.5 mg capsule, Take 1 capsule (4.5 mg total) by mouth 2 (two) times daily., Disp: 60 capsule, Rfl: 5    rotigotine (NEUPRO) 8 mg/24 hour PT24, Place 4 mg onto the skin every other day. Cut patch in half and use every other day to every 3 days, Disp: , Rfl:     simvastatin (ZOCOR) 80 MG tablet, Take 80 mg by mouth every evening., Disp: , Rfl:     tamsulosin (FLOMAX) 0.4 mg Cp24, Take 0.4 mg by mouth once daily., Disp: , Rfl:     venlafaxine (EFFEXOR) 75 MG tablet, Take 1 tablet (75 mg total) by mouth once daily., Disp: 30 tablet, Rfl: 1    Allergies  Review of patient's allergies indicates:  No Known Allergies    Social History  Social History     Social History    Marital status:      Spouse name: N/A    Number of children: 4    Years of education: N/A     Occupational History    Not on file.     Social History Main Topics    Smoking status: Never Smoker    Smokeless tobacco: Former User     Types: Chew    Alcohol use No    Drug use: No    Sexual activity: Not on file     Other Topics Concern    Not on file     Social History Narrative    No narrative on file       Family History  Family History   Problem Relation Age of Onset    Diabetes Mother     Cancer Mother     Cancer Father     Cancer Sister     Stroke Brother        Physical Exam  There were no vitals taken for this visit.    General appearance: Well-developed, well-groomed. In wheelchair due to long  walk into clinic.    Neurologic Exam: The patient is awake, alert and oriented. Language is fluent. Recent and remote memory are normal. Attention span and concentration are normal. Fund of knowledge is appropriate.     Cranial nerves: pupils are round and reactive to light and accommodation. Visual fields are full to confrontation. Ocular motility is full in all cardinal positions of gaze. Facial sensation is normal to pinprick and light touch. Corneal reflexes are present bilaterally. Facial activation is symmetric. Hearing is normal bilaterally. Palate elevates symmetrically and gag reflex is intact bilaterally. Shoulder elevation is symmetric and full strength bilaterally. Tongue is midline and neck rotation strength is normal bilaterally. Neck range of motion is normal.     Motor examination of all extremities demonstrates normal bulk and tone in all four limbs. There are no atrophy or fasciculations. Strength is 5/5 in the upper and lower extremities bilaterally without pronator drift.   RUE resting tremor- increased since last visit  Cogwheel rigidity in RUE>LUE  Masked facies  Decreased blink rate    Sensory examination is normal to pinprick, vibration and proprioception in the upper and lower extremities bilaterally. Romberg is negative.    Deep tendon reflexes are 2+ and symmetric in the upper and lower extremities bilaterally. Toes are mute bilaterally.     Gait: Decreased R arm swing with short choppy shuffling gait, multiple steps en bloc turn  +retropulsion  Inc bradykinetic with choppy steps  Requires increased effort to get out of wheelchair    Coordination: Finger to nose and heel to shin testing is normal in both upper and lower extremities. Rapid alternating movements are normal in both upper and lower extremities.     General exam  Cardiovascular: regular rate and rhythm with no murmurs, rubs or gallops. There are no carotid or vertebral artery bruits. Pulses in both upper and lower extremities  are symmetric. There is no peripheral edema.   Head and neck: no cervical lymphadenopathy            Lab and Test Results    WBC   Date Value Ref Range Status   01/24/2018 10.95 3.90 - 12.70 K/uL Final     Hemoglobin   Date Value Ref Range Status   01/24/2018 12.8 (L) 14.0 - 18.0 g/dL Final     Hematocrit   Date Value Ref Range Status   01/24/2018 38.9 (L) 40.0 - 54.0 % Final     Platelets   Date Value Ref Range Status   01/24/2018 225 150 - 350 K/uL Final     No results found for: GLU, NA, K, CL, CO2, BUN, CREATININE, CALCIUM, MG, PHOS  No results found for: ALB, ALKPHOS, ALT, AST      Images: none    Independently reviewed NO    Other Tests    Assessment and Plan    Problem List Items Addressed This Visit     None        Problem List Items Addressed This Visit     None        Start seroquel 12.5 mg QHS  Take for about 2 weeks- and if patient not having VH- increase stalevo to 1 tablet TID    PT    RTC 6-8 WEEKS        Lisseth Burgess MD  Neurology Resident   [facility] [address] [place] [address]  Pager: [phone]

## 2018-05-23 ENCOUNTER — TELEPHONE (OUTPATIENT)
Dept: NEUROLOGY | Facility: CLINIC | Age: 78
End: 2018-05-23

## 2018-05-23 ENCOUNTER — PATIENT MESSAGE (OUTPATIENT)
Dept: NEUROLOGY | Facility: CLINIC | Age: 78
End: 2018-05-23

## 2018-05-23 NOTE — TELEPHONE ENCOUNTER
----- Message from Danielle Cardoso sent at 5/23/2018  9:07 AM CDT -----  PER PT WIFE PT NEED  HEALTH. PLEASE CALL [facility] TO SET [facility] THERAPY, NOT OUTPATIENT THERAPY. THANKS!

## 2018-05-30 NOTE — PROGRESS NOTES
Patient seen and examined.  I agree with the history, exam, assessment and plan within the resident's note with any notable exceptions detailed below:              Ankur Arvizu MD, MPH  Division of Movement and Memory Disorders  [facility]

## 2018-07-02 RX ORDER — VENLAFAXINE 75 MG/1
75 TABLET ORAL DAILY
Qty: 30 TABLET | Refills: 1 | Status: SHIPPED | OUTPATIENT
Start: 2018-07-02 | End: 2018-09-19 | Stop reason: SDUPTHER

## 2018-07-05 ENCOUNTER — PATIENT MESSAGE (OUTPATIENT)
Dept: NEUROLOGY | Facility: CLINIC | Age: 78
End: 2018-07-05

## 2018-07-09 ENCOUNTER — TELEPHONE (OUTPATIENT)
Dept: NEUROLOGY | Facility: CLINIC | Age: 78
End: 2018-07-09

## 2018-07-09 NOTE — TELEPHONE ENCOUNTER
----- Message from Lisseth Burgess MD sent at 7/6/2018 12:43 PM CDT -----  Patient needs to be started on Nuplazid. Thanks

## 2018-07-11 ENCOUNTER — TELEPHONE (OUTPATIENT)
Dept: NEUROLOGY | Facility: CLINIC | Age: 78
End: 2018-07-11

## 2018-07-11 NOTE — TELEPHONE ENCOUNTER
----- Message from Tyshawn ROBERTS Simons sent at 7/11/2018  9:38 AM CDT -----  Needs Advice    Reason for call:  Samantha is asking for wound care orders for fall pt had 4 days ago. Samantha states it is on the right leg, 11 centimeters by 6 centimeters, and is superficial. Samantha is asking to clean it with saline, cover with an allevyn dressing, and to check it every 3 to 5 days.   Communication Preference: Samantha apple/ [facility] @ [phone]  Additional Information:

## 2018-07-12 ENCOUNTER — PATIENT MESSAGE (OUTPATIENT)
Dept: NEUROLOGY | Facility: CLINIC | Age: 78
End: 2018-07-12

## 2018-07-23 ENCOUNTER — TELEPHONE (OUTPATIENT)
Dept: NEUROLOGY | Facility: CLINIC | Age: 78
End: 2018-07-23

## 2018-07-26 ENCOUNTER — TELEPHONE (OUTPATIENT)
Dept: NEUROLOGY | Facility: CLINIC | Age: 78
End: 2018-07-26

## 2018-07-26 ENCOUNTER — TELEPHONE (OUTPATIENT)
Dept: ADMINISTRATIVE | Facility: CLINIC | Age: 78
End: 2018-07-26

## 2018-07-26 NOTE — TELEPHONE ENCOUNTER
VM message received from [facility] [phone] with notification of drug interaction  for Nuplazid and Venlafazine (Effexor).

## 2018-07-30 NOTE — TELEPHONE ENCOUNTER
Returned call to [facility] pharmacist Obdulio, advised per Dr. Arvizu benefit outweighs risk of interaction and to please dispense medication. Obdulio

## 2018-08-28 ENCOUNTER — TELEPHONE (OUTPATIENT)
Dept: NEUROLOGY | Facility: CLINIC | Age: 78
End: 2018-08-28

## 2018-09-19 ENCOUNTER — OFFICE VISIT (OUTPATIENT)
Dept: NEUROLOGY | Facility: CLINIC | Age: 78
End: 2018-09-19
Payer: MEDICARE

## 2018-09-19 VITALS
BODY MASS INDEX: 31.85 KG/M2 | HEART RATE: 59 BPM | DIASTOLIC BLOOD PRESSURE: 66 MMHG | SYSTOLIC BLOOD PRESSURE: 129 MMHG | WEIGHT: 210.13 LBS | HEIGHT: 68 IN

## 2018-09-19 DIAGNOSIS — E11.42 DIABETIC POLYNEUROPATHY ASSOCIATED WITH TYPE 2 DIABETES MELLITUS: ICD-10-CM

## 2018-09-19 DIAGNOSIS — F02.818 DEMENTIA DUE TO PARKINSON'S DISEASE WITH BEHAVIORAL DISTURBANCE: ICD-10-CM

## 2018-09-19 DIAGNOSIS — G20.A1 PARKINSON DISEASE: Primary | ICD-10-CM

## 2018-09-19 DIAGNOSIS — G20.A1 PSYCHOSIS DUE TO PARKINSON'S DISEASE: ICD-10-CM

## 2018-09-19 DIAGNOSIS — R35.0 URINARY FREQUENCY: ICD-10-CM

## 2018-09-19 DIAGNOSIS — G20.A1 DEMENTIA DUE TO PARKINSON'S DISEASE WITH BEHAVIORAL DISTURBANCE: ICD-10-CM

## 2018-09-19 DIAGNOSIS — R44.3 HALLUCINATIONS: ICD-10-CM

## 2018-09-19 DIAGNOSIS — F06.8 PSYCHOSIS DUE TO PARKINSON'S DISEASE: ICD-10-CM

## 2018-09-19 PROCEDURE — 99214 OFFICE O/P EST MOD 30 MIN: CPT | Mod: S$PBB,GC,, | Performed by: PSYCHIATRY & NEUROLOGY

## 2018-09-19 PROCEDURE — 99214 OFFICE O/P EST MOD 30 MIN: CPT | Mod: PBBFAC | Performed by: PSYCHIATRY & NEUROLOGY

## 2018-09-19 PROCEDURE — 99999 PR PBB SHADOW E&M-EST. PATIENT-LVL IV: CPT | Mod: PBBFAC,GC,, | Performed by: PSYCHIATRY & NEUROLOGY

## 2018-09-19 RX ORDER — RIVASTIGMINE TARTRATE 4.5 MG/1
4.5 CAPSULE ORAL 2 TIMES DAILY
Qty: 60 CAPSULE | Refills: 5 | Status: SHIPPED | OUTPATIENT
Start: 2018-09-19 | End: 2018-11-08 | Stop reason: SDUPTHER

## 2018-09-19 RX ORDER — CARBIDOPA AND LEVODOPA 25; 100 MG/1; MG/1
2 TABLET ORAL 3 TIMES DAILY
Qty: 180 TABLET | Refills: 0 | Status: SHIPPED | OUTPATIENT
Start: 2018-09-19 | End: 2018-10-15 | Stop reason: SDUPTHER

## 2018-09-19 RX ORDER — VENLAFAXINE 75 MG/1
75 TABLET ORAL DAILY
Qty: 30 TABLET | Refills: 1 | Status: SHIPPED | OUTPATIENT
Start: 2018-09-19 | End: 2018-10-23 | Stop reason: SDUPTHER

## 2018-09-19 RX ORDER — MEMANTINE HYDROCHLORIDE 10 MG/1
10 TABLET ORAL 2 TIMES DAILY
Qty: 60 TABLET | Refills: 6 | Status: SHIPPED | OUTPATIENT
Start: 2018-09-19 | End: 2019-01-22 | Stop reason: SDUPTHER

## 2018-09-19 NOTE — PROGRESS NOTES
"See resident note.  We saw the patient together, I examined individually, and we discussed the assessment and plan as she documented in her note.    78 yo M with PD for 8-10 years, previously treated by Dr. Arriaga, then in resident clinic.  Because of hallucinations, his requip was reduced from 8mg to 2mg over first few visits and he was changed from sinemet +comtan to stalevo.  Hallucinations worsened when he started stalevo, so wife cut the mid-day dose in half which reduced psychosis.  Nuplazid was started after last visit and that also helped.    Currently, both he and wife say he is doing ok.  He is ambulatory, but very slow, no falls.  He hallucinates, but mild, benign.  Neither can appreciate wearing off phenomenon, but at some point in his past with Corina, he could "barely walk" and so was increased on neupro which helped.    On exam, he is very bradykinetic, bilaterally rigid, no tremor.  Mild-moderate gait disturbance.     Assessment:  1.  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  Must be levodopa-responsive as he is ambulatory despite being on much less neupro.  However, it is clear from past and recent history that he also hallucinates with either levodopa or comtan dosing.  Thus, pharmacologic options may be limited or tricky.  He has never tried ropinerole, which is covered by medicare, but could worsen hallucinations.  2.  PD Psychosis, currently mild and controlled with nuplazid, but clearly worsens with increases in levodopa.    Rec:  1.  Transition stalevo back to sinemet, removing the comtan which is what may be causing the most psychosis.  2.  Continue neupro (given 8 week supply from my office) 2mg daily.  Eventually, he'll have to stop taking this and transition to ropinerole 0.25mg tid.  3.  Continue nuplazid 17mg for now (not recommended dose, but he's doing ok)        "

## 2018-09-19 NOTE — PATIENT INSTRUCTIONS
First 4 days:   Mornin red pill (stalevo)  Afternoon: 1 red pill (stalevo)  Night: 2 yellow pills (sinemet)    Next 4 days:  Mornin red pill (stalevo)  Afternoon: 2 yellow pills (sinemet)  Night: 2 yellow pills (sinemet)    After this: Take 2 yellow pills three times a day    Keep a log of the changes in stiffness, walking, tremors and hallucinations while making these changes.

## 2018-09-23 PROBLEM — G20.A1 PSYCHOSIS DUE TO PARKINSON'S DISEASE: Status: ACTIVE | Noted: 2018-09-23

## 2018-09-23 PROBLEM — E11.42 DIABETIC POLYNEUROPATHY ASSOCIATED WITH TYPE 2 DIABETES MELLITUS: Status: ACTIVE | Noted: 2018-09-23

## 2018-09-23 PROBLEM — F06.8 PSYCHOSIS DUE TO PARKINSON'S DISEASE: Status: ACTIVE | Noted: 2018-09-23

## 2018-09-23 PROBLEM — G20.A1 DEMENTIA IN PARKINSON'S DISEASE: Status: ACTIVE | Noted: 2018-09-23

## 2018-09-23 PROBLEM — F03.90 DEMENTIA: Status: ACTIVE | Noted: 2018-09-23

## 2018-09-23 PROBLEM — F02.80 DEMENTIA IN PARKINSON'S DISEASE: Status: ACTIVE | Noted: 2018-09-23

## 2018-09-24 NOTE — PROGRESS NOTES
Patient Name: Danelle Frankel  MRN: [id number]    CC: PD    HPI: Danelle Frankel is a 77 y.o. male with PD for 8-10 years back, DM, HTN, CAD s/p CABG, peripheral neuropathy presents to neurology clinic for management of PD. Patient was previously treated by Dr. Arriaga, then in resident clinic. Over the course of the years, wife reports coverage of multiple medications because of cost - these per chart review included azilect, neupro. Because of hallucinations, his requip was reduced from 8mg to 2mg over first few visits and he was changed from sinemet +comtan to stalevo. Wife reports that hallucinations worsened when he started stalevo, so wife cut the mid-day dose in half which reduced psychosis. Nuplazid was started after last visit for hallucinations with recommended dose of 34mg however patient only on 17mg qd as wife feels that increased dose did not make any difference but 17mg qd did seem to help reduce frequency of hallucinations. Currently patient on neupro - samples of 2mg. In the past reported that higher doses did seem to help him walk but had hallucinations. Patient is currently able to ambulate without assistance for short distances but otherwise uses a wheelchair or walker to ambulate longer distances  No reported dyskinesias.     Premotor sx's- +anosmia, + constipation, + REM behavior issues- acting out dreams, vivid dreams.       ROS:   Review of Systems   Constitutional: Negative for malaise/fatigue. Negative for weight loss.   HENT: Negative for hearing loss.   Eyes: Negative for blurred vision and double vision.   Respiratory: Negative for shortness of breath and stridor.   Cardiovascular: Negative for chest pain and palpitations.   Gastrointestinal: Negative for nausea, vomiting and constipation.   Genitourinary: Negative for frequency. Negative for urgency.   Musculoskeletal: Negative for joint pain.   Skin: Negative for rash.   Neurological: Negative for focal weakness and seizures.    Endo/Heme/Allergies: Does not bruise/bleed easily.       Past Medical History  Past Medical History:   Diagnosis Date    Cataract     Coronary artery disease     Dementia     Diabetes mellitus, type 2     Dyslipidemia     GERD (gastroesophageal reflux disease)     Gout     Hyperlipidemia     Hypertension     Parkinson disease     Venous insufficiency        Medications    Current Outpatient Medications:     allopurinol (ZYLOPRIM) 300 MG tablet, Take 1 tablet (300 mg total) by mouth once daily., Disp: 90 tablet, Rfl: 3    aspirin (ECOTRIN) 81 MG EC tablet, Take 81 mg by mouth once daily., Disp: , Rfl:     calcium carbonate (OS-YANG) 600 mg calcium (1,500 mg) Tab, Take 600 mg by mouth once., Disp: , Rfl:     carbidopa-levodopa-entacapone (STALEVO) -200 mg per tablet, Take 1 tablet by mouth 3 (three) times daily., Disp: , Rfl: 7    finasteride (PROSCAR) 5 mg tablet, Take 5 mg by mouth once daily., Disp: , Rfl:     isosorbide mononitrate (IMDUR) 30 MG 24 hr tablet, Take 30 mg by mouth 2 (two) times daily., Disp: , Rfl:     lisinopril (PRINIVIL,ZESTRIL) 2.5 MG tablet, Take 2.5 mg by mouth once daily., Disp: , Rfl:     memantine (NAMENDA) 10 MG Tab, Take 1 tablet (10 mg total) by mouth 2 (two) times daily., Disp: 60 tablet, Rfl: 6    metFORMIN (GLUCOPHAGE-XR) 500 MG 24 hr tablet, Take 2 tablets (1,000 mg total) by mouth once daily., Disp: 60 tablet, Rfl: 11    metoprolol succinate (TOPROL-XL) 25 MG 24 hr tablet, Take 25 mg by mouth once daily., Disp: , Rfl:     nitroGLYCERIN (NITROSTAT) 0.4 MG SL tablet, 1 UNDER TONGUE FOR CHEST PAIN AS NEEDED-MAY REPEAT IN 5 MINS MAX 3 TABS IN 15 MINUTES, Disp: , Rfl: 1    NUPLAZID 17 mg Tab, Take 1 tablet by mouth once daily., Disp: , Rfl:     omeprazole (PRILOSEC) 20 MG capsule, Take 20 mg by mouth once daily., Disp: , Rfl:     RESTASIS 0.05 % ophthalmic emulsion, INSTILL (PUT) 1 DROP IN EACH EYE TWICE DAILY, Disp: , Rfl: 4    rivastigmine tartrate 4.5  "mg capsule, Take 1 capsule (4.5 mg total) by mouth 2 (two) times daily., Disp: 60 capsule, Rfl: 5    simvastatin (ZOCOR) 80 MG tablet, Take 80 mg by mouth every evening., Disp: , Rfl:     tamsulosin (FLOMAX) 0.4 mg Cp24, Take 0.4 mg by mouth once daily., Disp: , Rfl:     tiZANidine (ZANAFLEX) 4 MG tablet, Take 1 tablet (4 mg total) by mouth every evening., Disp: 30 tablet, Rfl: 3    venlafaxine (EFFEXOR) 75 MG tablet, Take 1 tablet (75 mg total) by mouth once daily., Disp: 30 tablet, Rfl: 1    vitamin D 1000 units Tab, Take 2,000 Units by mouth once daily., Disp: , Rfl:     carbidopa-levodopa  mg (SINEMET)  mg per tablet, Take 2 tablets by mouth 3 (three) times daily., Disp: 180 tablet, Rfl: 0    Allergies  Review of patient's allergies indicates:   Allergen Reactions    Codeine        Social History  Social History     Socioeconomic History    Marital status:      Spouse name: Not on file    Number of children: 4    Years of education: Not on file    Highest education level: Not on file   Social Needs    Financial resource strain: Not on file    Food insecurity - worry: Not on file    Food insecurity - inability: Not on file    Transportation needs - medical: Not on file    Transportation needs - non-medical: Not on file   Occupational History    Not on file   Tobacco Use    Smoking status: Never Smoker    Smokeless tobacco: Former User     Types: Chew   Substance and Sexual Activity    Alcohol use: No    Drug use: No    Sexual activity: Not on file   Other Topics Concern    Not on file   Social History Narrative    Not on file       Family History  Family History   Problem Relation Age of Onset    Diabetes Mother     Cancer Mother     Cancer Father     Cancer Sister     Stroke Brother        Physical Exam  /66   Pulse (!) 59   Ht 5' 8" (1.727 m)   Wt 95.3 kg (210 lb 1.6 oz)   BMI 31.95 kg/m²     General appearance: Well-developed, well-groomed. "     Neurologic Exam: The patient is awake, alert and oriented.     Cranial nerves: pupils are round and reactive to light and accommodation. Visual fields are full to confrontation. Ocular motility is full in all cardinal positions of gaze. Facial sensation is normal to pinprick and light touch. Facial activation is symmetric. Hearing is normal bilaterally. Palate elevates symmetrically and gag reflex is intact bilaterally. Shoulder elevation is symmetric and full strength bilaterally. Tongue is midline and neck rotation strength is normal bilaterally. Neck range of motion is normal.     Motor examination of all extremities demonstrates normal bulk. Increased tone in all 4 extremities. Strength is 5/5 in the upper and lower extremities bilaterally without pronator drift.     Sensory examination is decreased to pinprick, vibration and proprioception distally in the lower extremities and improves proximally.     Deep tendon reflexes are 2+ and symmetric in the upper and lower extremities bilaterally.     Coordination: Finger to nose and heel to shin testing is normal in both upper and lower extremities.     Movement exam: +bradykinesia b/l, + cogwheel rigidity, no notable resting tremor. +shuffling gait, turn en bloc. +hypomimia      Lab and Test Results    WBC   Date Value Ref Range Status   01/24/2018 10.95 3.90 - 12.70 K/uL Final     Hemoglobin   Date Value Ref Range Status   01/24/2018 12.8 (L) 14.0 - 18.0 g/dL Final     Hematocrit   Date Value Ref Range Status   01/24/2018 38.9 (L) 40.0 - 54.0 % Final     Platelets   Date Value Ref Range Status   01/24/2018 225 150 - 350 K/uL Final     No results found for: GLU, NA, K, CL, CO2, BUN, CREATININE, CALCIUM, MG, PHOS  No results found for: ALB, ALKPHOS, ALT, AST      Images:     None    Assessment and Plan    Danelle Frankel is a 77 y.o. male with PD.     Problem List Items Addressed This Visit        Unprioritized    Parkinson disease - Primary    Overview     2010  - PD, rigid-type.          Current Assessment & Plan     Symptoms seem to currently be stable on stalevo except patient having hallucinations. WIfe reporting improved hallucinations with decreased dose of stalevo and last year reported increased hallucinations with comtan.     - Plan to transition to CD/LD 25/100 2tabs TID from the stalevo. Plan as detailed below  - Continue neupro for now - samples provided from office for another 8 weeks. When out of the samples, will switch to requip 0.25mg TID    Titration schedule off stalevo    First 4 days:   Mornin red pill (stalevo)  Afternoon: 1 red pill (stalevo)  Night: 2 yellow pills (sinemet)    Next 4 days:  Mornin red pill (stalevo)  Afternoon: 2 yellow pills (sinemet)  Night: 2 yellow pills (sinemet)    After this: Take 2 yellow pills three times a day    Keep a log of the changes in stiffness, walking, tremors and hallucinations while making these changes.            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

## 2018-09-24 NOTE — ASSESSMENT & PLAN NOTE
Symptoms seem to currently be stable on stalevo except patient having hallucinations. WIfe reporting improved hallucinations with decreased dose of stalevo and last year reported increased hallucinations with comtan.     - Plan to transition to CD/LD 25/100 2tabs TID from the stalevo. Plan as detailed below  - Continue neupro for now - samples provided from office for another 8 weeks. When out of the samples, will switch to requip 0.25mg TID    Titration schedule off stalevo    First 4 days:   Mornin red pill (stalevo)  Afternoon: 1 red pill (stalevo)  Night: 2 yellow pills (sinemet)    Next 4 days:  Mornin red pill (stalevo)  Afternoon: 2 yellow pills (sinemet)  Night: 2 yellow pills (sinemet)    After this: Take 2 yellow pills three times a day    Keep a log of the changes in stiffness, walking, tremors and hallucinations while making these changes.

## 2018-10-16 RX ORDER — CARBIDOPA AND LEVODOPA 25; 100 MG/1; MG/1
TABLET ORAL
Qty: 180 TABLET | Refills: 0 | Status: SHIPPED | OUTPATIENT
Start: 2018-10-16 | End: 2018-10-23 | Stop reason: SDUPTHER

## 2018-10-23 ENCOUNTER — OFFICE VISIT (OUTPATIENT)
Dept: NEUROLOGY | Facility: CLINIC | Age: 78
End: 2018-10-23
Payer: MEDICARE

## 2018-10-23 VITALS
HEART RATE: 50 BPM | HEIGHT: 68 IN | BODY MASS INDEX: 32.61 KG/M2 | DIASTOLIC BLOOD PRESSURE: 70 MMHG | WEIGHT: 215.19 LBS | SYSTOLIC BLOOD PRESSURE: 122 MMHG

## 2018-10-23 DIAGNOSIS — R44.3 HALLUCINATIONS: ICD-10-CM

## 2018-10-23 DIAGNOSIS — G20.A1 DEMENTIA DUE TO PARKINSON'S DISEASE WITH BEHAVIORAL DISTURBANCE: ICD-10-CM

## 2018-10-23 DIAGNOSIS — F06.8 PSYCHOSIS DUE TO PARKINSON'S DISEASE: ICD-10-CM

## 2018-10-23 DIAGNOSIS — G20.A1 PSYCHOSIS DUE TO PARKINSON'S DISEASE: ICD-10-CM

## 2018-10-23 DIAGNOSIS — E11.42 DIABETIC POLYNEUROPATHY ASSOCIATED WITH TYPE 2 DIABETES MELLITUS: ICD-10-CM

## 2018-10-23 DIAGNOSIS — G20.A1 PARKINSON DISEASE: ICD-10-CM

## 2018-10-23 DIAGNOSIS — F02.818 DEMENTIA DUE TO PARKINSON'S DISEASE WITH BEHAVIORAL DISTURBANCE: ICD-10-CM

## 2018-10-23 PROCEDURE — 99214 OFFICE O/P EST MOD 30 MIN: CPT | Mod: S$PBB,GC,, | Performed by: PSYCHIATRY & NEUROLOGY

## 2018-10-23 PROCEDURE — 99213 OFFICE O/P EST LOW 20 MIN: CPT | Mod: PBBFAC | Performed by: PSYCHIATRY & NEUROLOGY

## 2018-10-23 PROCEDURE — 99999 PR PBB SHADOW E&M-EST. PATIENT-LVL III: CPT | Mod: PBBFAC,GC,, | Performed by: PSYCHIATRY & NEUROLOGY

## 2018-10-23 RX ORDER — CARBIDOPA AND LEVODOPA 25; 100 MG/1; MG/1
2 TABLET ORAL 3 TIMES DAILY
Qty: 180 TABLET | Refills: 3 | Status: SHIPPED | OUTPATIENT
Start: 2018-10-23 | End: 2018-10-23 | Stop reason: SDUPTHER

## 2018-10-23 RX ORDER — CARBIDOPA AND LEVODOPA 25; 100 MG/1; MG/1
2 TABLET ORAL 3 TIMES DAILY
Qty: 180 TABLET | Refills: 3 | Status: SHIPPED | OUTPATIENT
Start: 2018-10-23 | End: 2019-01-22 | Stop reason: SDUPTHER

## 2018-10-23 RX ORDER — VENLAFAXINE 75 MG/1
75 TABLET ORAL DAILY
Qty: 30 TABLET | Refills: 6 | Status: SHIPPED | OUTPATIENT
Start: 2018-10-23 | End: 2018-10-23 | Stop reason: SDUPTHER

## 2018-10-23 RX ORDER — VENLAFAXINE 75 MG/1
75 TABLET ORAL DAILY
Qty: 30 TABLET | Refills: 6 | Status: SHIPPED | OUTPATIENT
Start: 2018-10-23 | End: 2019-01-22 | Stop reason: SDUPTHER

## 2018-10-23 NOTE — PROGRESS NOTES
Patient Name: Danelle Frankel  MRN: [id number]    CC: PD    Interval history:  Since last visit, patient transitioned off stalevo to sinemet 25/100 (2 tablets) TID. Continues to have intermittent hallucinations but reports that it is much improved compared to prior. Wife also reports that the walking has improved some. Continues to be on the neupro patch 2mg - however wife states that she might sometimes forget to change the patches daily and usually only remembers when she gives patient a bath every 1-2 days.     HPI: Danelle Frankel is a 78 y.o. male with PD for 8-10 years back, DM, HTN, CAD s/p CABG, peripheral neuropathy presents to neurology clinic for management of PD. Patient was previously treated by Dr. Arriaga, then in resident clinic. Over the course of the years, wife reports coverage of multiple medications because of cost - these per chart review included azilect, neupro. Because of hallucinations, his requip was reduced from 8mg to 2mg over first few visits and he was changed from sinemet +comtan to stalevo. Wife reports that hallucinations worsened when he started stalevo, so wife cut the mid-day dose in half which reduced psychosis. Nuplazid was started after last visit for hallucinations with recommended dose of 34mg however patient only on 17mg qd as wife feels that increased dose did not make any difference but 17mg qd did seem to help reduce frequency of hallucinations. Currently patient on neupro - samples of 2mg. In the past reported that higher doses did seem to help him walk but had hallucinations. Patient is currently able to ambulate without assistance for short distances but otherwise uses a wheelchair or walker to ambulate longer distances  No reported dyskinesias.     Premotor sx's- +anosmia, + constipation, + REM behavior issues- acting out dreams, vivid dreams.     ROS:   Review of Systems   Constitutional: Negative for malaise/fatigue. Negative for weight loss.   HENT: Negative for  hearing loss.   Eyes: Negative for blurred vision and double vision.   Respiratory: Negative for shortness of breath and stridor.   Cardiovascular: Negative for chest pain and palpitations.   Gastrointestinal: Negative for nausea, vomiting and constipation.   Genitourinary: Negative for frequency. Negative for urgency.   Musculoskeletal: Negative for joint pain.   Skin: Negative for rash.   Neurological: Negative for focal weakness and seizures.   Endo/Heme/Allergies: Does not bruise/bleed easily.       Past Medical History  Past Medical History:   Diagnosis Date    Cataract     Coronary artery disease     Dementia     Diabetes mellitus, type 2     Dyslipidemia     GERD (gastroesophageal reflux disease)     Gout     Hyperlipidemia     Hypertension     Parkinson disease     Venous insufficiency        Medications    Current Outpatient Medications:     allopurinol (ZYLOPRIM) 300 MG tablet, Take 1 tablet (300 mg total) by mouth once daily., Disp: 90 tablet, Rfl: 3    aspirin (ECOTRIN) 81 MG EC tablet, Take 81 mg by mouth once daily., Disp: , Rfl:     calcium carbonate (OS-YANG) 600 mg calcium (1,500 mg) Tab, Take 600 mg by mouth once., Disp: , Rfl:     carbidopa-levodopa  mg (SINEMET)  mg per tablet, TAKE TWO TABLETS BY MOUTH THREE TIMES DAILY, Disp: 180 tablet, Rfl: 0    carbidopa-levodopa-entacapone (STALEVO) -200 mg per tablet, Take 1 tablet by mouth 3 (three) times daily., Disp: , Rfl: 7    finasteride (PROSCAR) 5 mg tablet, Take 5 mg by mouth once daily., Disp: , Rfl:     isosorbide mononitrate (IMDUR) 30 MG 24 hr tablet, Take 30 mg by mouth 2 (two) times daily., Disp: , Rfl:     lisinopril (PRINIVIL,ZESTRIL) 2.5 MG tablet, Take 2.5 mg by mouth once daily., Disp: , Rfl:     memantine (NAMENDA) 10 MG Tab, Take 1 tablet (10 mg total) by mouth 2 (two) times daily., Disp: 60 tablet, Rfl: 6    metFORMIN (GLUCOPHAGE-XR) 500 MG 24 hr tablet, Take 2 tablets (1,000 mg total) by mouth  once daily., Disp: 60 tablet, Rfl: 11    metoprolol succinate (TOPROL-XL) 25 MG 24 hr tablet, Take 25 mg by mouth once daily., Disp: , Rfl:     nitroGLYCERIN (NITROSTAT) 0.4 MG SL tablet, 1 UNDER TONGUE FOR CHEST PAIN AS NEEDED-MAY REPEAT IN 5 MINS MAX 3 TABS IN 15 MINUTES, Disp: , Rfl: 1    NUPLAZID 17 mg Tab, Take 1 tablet by mouth once daily., Disp: , Rfl:     omeprazole (PRILOSEC) 20 MG capsule, Take 20 mg by mouth once daily., Disp: , Rfl:     RESTASIS 0.05 % ophthalmic emulsion, INSTILL (PUT) 1 DROP IN EACH EYE TWICE DAILY, Disp: , Rfl: 4    rivastigmine tartrate 4.5 mg capsule, Take 1 capsule (4.5 mg total) by mouth 2 (two) times daily., Disp: 60 capsule, Rfl: 5    simvastatin (ZOCOR) 80 MG tablet, Take 80 mg by mouth every evening., Disp: , Rfl:     tamsulosin (FLOMAX) 0.4 mg Cp24, Take 0.4 mg by mouth once daily., Disp: , Rfl:     tiZANidine (ZANAFLEX) 4 MG tablet, Take 1 tablet (4 mg total) by mouth every evening., Disp: 30 tablet, Rfl: 3    venlafaxine (EFFEXOR) 75 MG tablet, Take 1 tablet (75 mg total) by mouth once daily., Disp: 30 tablet, Rfl: 1    vitamin D 1000 units Tab, Take 2,000 Units by mouth once daily., Disp: , Rfl:     Allergies  Review of patient's allergies indicates:   Allergen Reactions    Codeine        Social History  Social History     Socioeconomic History    Marital status:      Spouse name: Not on file    Number of children: 4    Years of education: Not on file    Highest education level: Not on file   Social Needs    Financial resource strain: Not on file    Food insecurity - worry: Not on file    Food insecurity - inability: Not on file    Transportation needs - medical: Not on file    Transportation needs - non-medical: Not on file   Occupational History    Not on file   Tobacco Use    Smoking status: Never Smoker    Smokeless tobacco: Former User     Types: Chew   Substance and Sexual Activity    Alcohol use: No    Drug use: No    Sexual  activity: Not on file   Other Topics Concern    Not on file   Social History Narrative    Not on file       Family History  Family History   Problem Relation Age of Onset    Diabetes Mother     Cancer Mother     Cancer Father     Cancer Sister     Stroke Brother        Physical Exam  There were no vitals taken for this visit.    General appearance: Well-developed, well-groomed.     Neurologic Exam: The patient is awake, alert and oriented.     Cranial nerves: pupils are round and reactive to light and accommodation. Visual fields are full to confrontation. Ocular motility is full in all cardinal positions of gaze. Facial sensation is normal to pinprick and light touch. Facial activation is symmetric. Hearing is normal bilaterally. Palate elevates symmetrically and gag reflex is intact bilaterally. Shoulder elevation is symmetric and full strength bilaterally. Tongue is midline and neck rotation strength is normal bilaterally. Neck range of motion is normal.     Motor examination of all extremities demonstrates normal bulk. Increased tone in all 4 extremities. Strength is 5/5 in the upper and lower extremities bilaterally without pronator drift.     Sensory examination is decreased to pinprick, vibration and proprioception distally in the lower extremities and improves proximally.     Deep tendon reflexes are 2+ and symmetric in the upper and lower extremities bilaterally.     Coordination: Finger to nose and heel to shin testing is normal in both upper and lower extremities.     Movement exam: +bradykinesia b/l, + cogwheel rigidity, no notable resting tremor. +shuffling gait, turn en bloc. +hypomimia      Lab and Test Results    WBC   Date Value Ref Range Status   01/24/2018 10.95 3.90 - 12.70 K/uL Final     Hemoglobin   Date Value Ref Range Status   01/24/2018 12.8 (L) 14.0 - 18.0 g/dL Final     Hematocrit   Date Value Ref Range Status   01/24/2018 38.9 (L) 40.0 - 54.0 % Final     Platelets   Date Value Ref  "Range Status   01/24/2018 225 150 - 350 K/uL Final     No results found for: GLU, NA, K, CL, CO2, BUN, CREATININE, CALCIUM, MG, PHOS  No results found for: ALB, ALKPHOS, ALT, AST      Images:     None    Assessment and Plan    Danelle Frankel is a 78 y.o. male with PD.     Problem List Items Addressed This Visit        1 - High    Parkinson disease    Overview     2010 - PD, rigid-type.          Current Assessment & Plan     Symptoms currently stable on sinemet 25/100 (2 tablets) TID and neupro 2mg.   Unsure if the neupro patch is providing much benefit as it is not being changed daily.   Recommend decreasing dose of neurpo patch to 1mg daily with plans to wean off slowly.   In the future, if increased dopamine needed for better symptom control once off the patch, then can consider switching to sinemet 25/250 TID            2     Hallucinations    Overview     Levodopa, comtan/neupro induced         Current Assessment & Plan     Will continue current regimen for dopaminergics as mentioned above  Continue current dose of nuplazid 17mg qd            3     Psychosis due to Parkinson's disease    Overview     Seems to have been induced by levodopa, comtan specifically and neupro in the past         Current Assessment & Plan     Stable  Please see "hallucinations" for details            4     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

## 2018-10-23 NOTE — LETTER
Endocrine/General Surgery  [address] [place] [address]  Phone: [phone]  Fax: [phone] October 23, 2018     Patient: Danelle Frankel   YOB: 1940   Date of Visit: 10/23/2018       To whom it may concern,    Due to underlying medical condition, Mr. Frankel will need assistance in the voting webster.       Please let me know if I can be of further assistance.                Jayleen Garduno MD

## 2018-10-24 NOTE — ASSESSMENT & PLAN NOTE
Will continue current regimen for dopaminergics as mentioned above  Continue current dose of nuplazid 17mg qd

## 2018-10-24 NOTE — ASSESSMENT & PLAN NOTE
Symptoms currently stable on sinemet 25/100 (2 tablets) TID and neupro 2mg.   Unsure if the neupro patch is providing much benefit as it is not being changed daily.   Recommend decreasing dose of neurpo patch to 1mg daily with plans to wean off slowly.   In the future, if increased dopamine needed for better symptom control once off the patch, then can consider switching to sinemet 25/250 TID

## 2018-10-31 NOTE — PROGRESS NOTES
Patient seen and examined.  I agree with the history, exam, assessment and plan within the resident's note.            Ankur Arvizu MD, MPH  Division of Movement and Memory Disorders  [facility]

## 2018-11-01 ENCOUNTER — PATIENT MESSAGE (OUTPATIENT)
Dept: NEUROLOGY | Facility: CLINIC | Age: 78
End: 2018-11-01

## 2018-11-08 RX ORDER — RIVASTIGMINE TARTRATE 4.5 MG/1
4.5 CAPSULE ORAL 2 TIMES DAILY
Qty: 60 CAPSULE | Refills: 5 | Status: SHIPPED | OUTPATIENT
Start: 2018-11-08 | End: 2019-01-22

## 2018-11-28 NOTE — PROGRESS NOTES
Patient seen and examined.  I agree with the history, exam, assessment and plan within the resident's note.        Ankur Arvizu MD, MPH  Division of Movement and Memory Disorders  [facility]       Yes

## 2019-01-22 ENCOUNTER — OFFICE VISIT (OUTPATIENT)
Dept: NEUROLOGY | Facility: CLINIC | Age: 79
End: 2019-01-22
Payer: MEDICARE

## 2019-01-22 VITALS
DIASTOLIC BLOOD PRESSURE: 66 MMHG | SYSTOLIC BLOOD PRESSURE: 129 MMHG | BODY MASS INDEX: 33.48 KG/M2 | HEIGHT: 68 IN | HEART RATE: 55 BPM | WEIGHT: 220.88 LBS

## 2019-01-22 DIAGNOSIS — R44.3 HALLUCINATIONS: ICD-10-CM

## 2019-01-22 DIAGNOSIS — F06.8 PSYCHOSIS DUE TO PARKINSON'S DISEASE: ICD-10-CM

## 2019-01-22 DIAGNOSIS — G20.A1 PSYCHOSIS DUE TO PARKINSON'S DISEASE: ICD-10-CM

## 2019-01-22 DIAGNOSIS — R35.0 URINARY FREQUENCY: ICD-10-CM

## 2019-01-22 DIAGNOSIS — G20.A1 DEMENTIA DUE TO PARKINSON'S DISEASE WITH BEHAVIORAL DISTURBANCE: ICD-10-CM

## 2019-01-22 DIAGNOSIS — F02.818 DEMENTIA DUE TO PARKINSON'S DISEASE WITH BEHAVIORAL DISTURBANCE: ICD-10-CM

## 2019-01-22 DIAGNOSIS — E11.42 DIABETIC POLYNEUROPATHY ASSOCIATED WITH TYPE 2 DIABETES MELLITUS: ICD-10-CM

## 2019-01-22 DIAGNOSIS — G20.A1 PARKINSON DISEASE: ICD-10-CM

## 2019-01-22 PROCEDURE — 99213 OFFICE O/P EST LOW 20 MIN: CPT | Mod: PBBFAC | Performed by: PSYCHIATRY & NEUROLOGY

## 2019-01-22 PROCEDURE — 99999 PR PBB SHADOW E&M-EST. PATIENT-LVL III: ICD-10-PCS | Mod: PBBFAC,GC,, | Performed by: PSYCHIATRY & NEUROLOGY

## 2019-01-22 PROCEDURE — 99214 PR OFFICE/OUTPT VISIT, EST, LEVL IV, 30-39 MIN: ICD-10-PCS | Mod: S$PBB,GC,, | Performed by: PSYCHIATRY & NEUROLOGY

## 2019-01-22 PROCEDURE — 99214 OFFICE O/P EST MOD 30 MIN: CPT | Mod: S$PBB,GC,, | Performed by: PSYCHIATRY & NEUROLOGY

## 2019-01-22 PROCEDURE — 99999 PR PBB SHADOW E&M-EST. PATIENT-LVL III: CPT | Mod: PBBFAC,GC,, | Performed by: PSYCHIATRY & NEUROLOGY

## 2019-01-22 RX ORDER — VENLAFAXINE 75 MG/1
75 TABLET ORAL DAILY
Qty: 30 TABLET | Refills: 6 | Status: SHIPPED | OUTPATIENT
Start: 2019-01-22 | End: 2019-04-21 | Stop reason: SDUPTHER

## 2019-01-22 RX ORDER — CARBIDOPA AND LEVODOPA 25; 100 MG/1; MG/1
2 TABLET ORAL 3 TIMES DAILY
Qty: 180 TABLET | Refills: 3 | Status: SHIPPED | OUTPATIENT
Start: 2019-01-22 | End: 2019-04-21 | Stop reason: SDUPTHER

## 2019-01-22 RX ORDER — MEMANTINE HYDROCHLORIDE 10 MG/1
10 TABLET ORAL 2 TIMES DAILY
Qty: 60 TABLET | Refills: 6 | Status: SHIPPED | OUTPATIENT
Start: 2019-01-22 | End: 2019-04-21 | Stop reason: SDUPTHER

## 2019-01-22 RX ORDER — RIVASTIGMINE TARTRATE 4.5 MG/1
4.5 CAPSULE ORAL 2 TIMES DAILY
Qty: 60 CAPSULE | Refills: 5 | Status: SHIPPED | OUTPATIENT
Start: 2019-01-22 | End: 2019-04-21 | Stop reason: SDUPTHER

## 2019-01-22 NOTE — PROGRESS NOTES
Patient Name: Danelle Frankel  MRN: [id number]    CC: PD    Interval history:  Since last visit, patient had episodes hallucinations infrequently, 2-3X/month. Most of them involving him seeing babies or animals in the room. Patient has insight. Continues to be on neupro patch 2mg every other day. Gait stable. Uses a walker for long distances or outside home but otherwise not at home    Interval history (10/2018):  Since last visit, patient transitioned off stalevo to sinemet 25/100 (2 tablets) TID. Continues to have intermittent hallucinations but reports that it is much improved compared to prior. Wife also reports that the walking has improved some. Continues to be on the neupro patch 2mg - however wife states that she might sometimes forget to change the patches daily and usually only remembers when she gives patient a bath every 1-2 days.     HPI: Danelle Frankel is a 78 y.o. male with PD for 8-10 years back, DM, HTN, CAD s/p CABG, peripheral neuropathy presents to neurology clinic for management of PD. Patient was previously treated by Dr. Arriaga, then in resident clinic. Over the course of the years, wife reports coverage of multiple medications because of cost - these per chart review included azilect, neupro. Because of hallucinations, his requip was reduced from 8mg to 2mg over first few visits and he was changed from sinemet +comtan to stalevo. Wife reports that hallucinations worsened when he started stalevo, so wife cut the mid-day dose in half which reduced psychosis. Nuplazid was started after last visit for hallucinations with recommended dose of 34mg however patient only on 17mg qd as wife feels that increased dose did not make any difference but 17mg qd did seem to help reduce frequency of hallucinations. Currently patient on neupro - samples of 2mg. In the past reported that higher doses did seem to help him walk but had hallucinations. Patient is currently able to ambulate without assistance for  short distances but otherwise uses a wheelchair or walker to ambulate longer distances  No reported dyskinesias.     Premotor sx's- +anosmia, + constipation, + REM behavior issues- acting out dreams, vivid dreams.     ROS:   Review of Systems   Constitutional: Negative for malaise/fatigue. Negative for weight loss.   HENT: Negative for hearing loss.   Eyes: Negative for blurred vision and double vision.   Respiratory: Negative for shortness of breath and stridor.   Cardiovascular: Negative for chest pain and palpitations.   Gastrointestinal: Negative for nausea, vomiting and constipation.   Genitourinary: Negative for frequency. Negative for urgency.   Musculoskeletal: Negative for joint pain.   Skin: Negative for rash.   Neurological: Negative for focal weakness and seizures.   Endo/Heme/Allergies: Does not bruise/bleed easily.       Past Medical History  Past Medical History:   Diagnosis Date    Cataract     Coronary artery disease     Dementia     Diabetes mellitus, type 2     Dyslipidemia     GERD (gastroesophageal reflux disease)     Gout     Hyperlipidemia     Hypertension     Parkinson disease     Venous insufficiency        Medications    Current Outpatient Medications:     allopurinol (ZYLOPRIM) 300 MG tablet, TAKE ONE TABLET BY MOUTH DAILY, Disp: 90 tablet, Rfl: 3    aspirin (ECOTRIN) 81 MG EC tablet, Take 81 mg by mouth once daily., Disp: , Rfl:     calcium carbonate (OS-YANG) 600 mg calcium (1,500 mg) Tab, Take 600 mg by mouth once., Disp: , Rfl:     carbidopa-levodopa  mg (SINEMET)  mg per tablet, Take 2 tablets by mouth 3 (three) times daily., Disp: 180 tablet, Rfl: 3    finasteride (PROSCAR) 5 mg tablet, Take 5 mg by mouth once daily., Disp: , Rfl:     fluocinonide (LIDEX) 0.05 % external solution, USE ONE APPLICATION TOPICALLY TO RASH ON SCALP AS NEEDED FLARE EVERY DAY -DO NOT USE ON FACE, GROIN OR FLEXURES., Disp: , Rfl: 0    FLUZONE HIGH-DOSE 2018-19, PF, 180 mcg/0.5 mL  vaccine, ADM 0.5ML IM UTD, Disp: , Rfl: 0    hydrocortisone 2.5 % cream, APPLY TO AFFECTED AREA TWICE DAILY for rash flare ups, Disp: , Rfl: 3    isosorbide mononitrate (IMDUR) 30 MG 24 hr tablet, Take 30 mg by mouth 2 (two) times daily., Disp: , Rfl:     ketoconazole (NIZORAL) 2 % cream, 1 application 2 (two) times daily., Disp: , Rfl: 11    lisinopril (PRINIVIL,ZESTRIL) 2.5 MG tablet, Take 2.5 mg by mouth once daily., Disp: , Rfl:     memantine (NAMENDA) 10 MG Tab, Take 1 tablet (10 mg total) by mouth 2 (two) times daily., Disp: 60 tablet, Rfl: 6    metFORMIN (GLUCOPHAGE-XR) 500 MG 24 hr tablet, Take 2 tablets (1,000 mg total) by mouth once daily., Disp: 60 tablet, Rfl: 11    metoprolol succinate (TOPROL-XL) 25 MG 24 hr tablet, Take 25 mg by mouth once daily., Disp: , Rfl:     nitroGLYCERIN (NITROSTAT) 0.4 MG SL tablet, 1 UNDER TONGUE FOR CHEST PAIN AS NEEDED-MAY REPEAT IN 5 MINS MAX 3 TABS IN 15 MINUTES, Disp: , Rfl: 1    NUPLAZID 17 mg Tab, Take 1 tablet by mouth once daily., Disp: , Rfl:     omeprazole (PRILOSEC) 20 MG capsule, Take 20 mg by mouth once daily., Disp: , Rfl:     RESTASIS 0.05 % ophthalmic emulsion, INSTILL (PUT) 1 DROP IN EACH EYE TWICE DAILY, Disp: , Rfl: 4    rivastigmine tartrate 4.5 mg capsule, Take 1 capsule (4.5 mg total) by mouth 2 (two) times daily., Disp: 60 capsule, Rfl: 5    simvastatin (ZOCOR) 80 MG tablet, Take 80 mg by mouth every evening., Disp: , Rfl:     tamsulosin (FLOMAX) 0.4 mg Cp24, Take 0.4 mg by mouth once daily., Disp: , Rfl:     tiZANidine (ZANAFLEX) 4 MG tablet, TAKE ONE TABLET BY MOUTH EVERY EVENING, Disp: 30 tablet, Rfl: 3    venlafaxine (EFFEXOR) 75 MG tablet, Take 1 tablet (75 mg total) by mouth once daily., Disp: 30 tablet, Rfl: 6    vitamin D 1000 units Tab, Take 2,000 Units by mouth once daily., Disp: , Rfl:     Allergies  Review of patient's allergies indicates:   Allergen Reactions    Codeine        Social History  Social History  "    Socioeconomic History    Marital status:      Spouse name: Not on file    Number of children: 4    Years of education: Not on file    Highest education level: Not on file   Social Needs    Financial resource strain: Not on file    Food insecurity - worry: Not on file    Food insecurity - inability: Not on file    Transportation needs - medical: Not on file    Transportation needs - non-medical: Not on file   Occupational History    Not on file   Tobacco Use    Smoking status: Never Smoker    Smokeless tobacco: Former User     Types: Chew   Substance and Sexual Activity    Alcohol use: No    Drug use: No    Sexual activity: Not on file   Other Topics Concern    Not on file   Social History Narrative    Not on file       Family History  Family History   Problem Relation Age of Onset    Diabetes Mother     Cancer Mother     Cancer Father     Cancer Sister     Stroke Brother        Physical Exam  /66   Pulse (!) 55   Ht 5' 8" (1.727 m)   Wt 100.2 kg (220 lb 14.4 oz)   BMI 33.59 kg/m²     General appearance: Well-developed, well-groomed.     Neurologic Exam: The patient is awake, alert and oriented.     Cranial nerves: pupils are round and reactive to light and accommodation. Visual fields are full to confrontation. Ocular motility is full in all cardinal positions of gaze. Facial sensation is normal to pinprick and light touch. Facial activation is symmetric. Hearing is normal bilaterally. Palate elevates symmetrically and gag reflex is intact bilaterally. Shoulder elevation is symmetric and full strength bilaterally. Tongue is midline and neck rotation strength is normal bilaterally. Neck range of motion is normal.     Motor examination of all extremities demonstrates normal bulk. Increased tone in all 4 extremities. Strength is 5/5 in the upper and lower extremities bilaterally without pronator drift.     Sensory examination is decreased to pinprick, vibration and " proprioception distally in the lower extremities and improves proximally.     Deep tendon reflexes are 2+ and symmetric in the upper and lower extremities bilaterally.     Coordination: Finger to nose and heel to shin testing is normal in both upper and lower extremities.     Movement exam: +bradykinesia b/l, + cogwheel rigidity, no notable resting tremor. +shuffling gait, turn en bloc. +hypomimia      Lab and Test Results    WBC   Date Value Ref Range Status   01/24/2018 10.95 3.90 - 12.70 K/uL Final     Hemoglobin   Date Value Ref Range Status   01/24/2018 12.8 (L) 14.0 - 18.0 g/dL Final     Hematocrit   Date Value Ref Range Status   01/24/2018 38.9 (L) 40.0 - 54.0 % Final     Platelets   Date Value Ref Range Status   01/24/2018 225 150 - 350 K/uL Final     No results found for: GLU, NA, K, CL, CO2, BUN, CREATININE, CALCIUM, MG, PHOS  No results found for: ALB, ALKPHOS, ALT, AST      Images:     None    Assessment and Plan    Danelle Frankel is a 78 y.o. male with PD.     Problem List Items Addressed This Visit        1 - High    Parkinson disease    Overview     2010 - PD, rigid-type.          Current Assessment & Plan     Symptoms currently stable on sinemet 25/100 (2 tablets) TID and neupro 2mg.   Unsure if the neupro patch is providing much benefit as it is not being changed daily.   Wife would like to continue with existing supply of the neupro patch. Once that is done, In the future, if increased dopamine needed for better symptom control once off the patch, then can consider switching to sinemet 25/250 TID.             2     Hallucinations    Overview     Levodopa, comtan/neupro induced. Also could be seen with PD dementia         Current Assessment & Plan     Still has hallucinations but infrequent and not bothersome to patient or his wife.   Will continue current regimen for dopaminergics as mentioned above  Continue current dose of nuplazid 17mg qd for now.   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

## 2019-01-22 NOTE — ASSESSMENT & PLAN NOTE
Still has hallucinations but infrequent and not bothersome to patient or his wife.   Will continue current regimen for dopaminergics as mentioned above  Continue current dose of nuplazid 17mg qd for now.   If in the future, hallucinations become more bothersome, can consider discontinuing neupro patch early and also possibly increasing dose to nuplazid to 34mg qd

## 2019-01-22 NOTE — ASSESSMENT & PLAN NOTE
Symptoms currently stable on sinemet 25/100 (2 tablets) TID and neupro 2mg.   Unsure if the neupro patch is providing much benefit as it is not being changed daily.   Wife would like to continue with existing supply of the neupro patch. Once that is done, In the future, if increased dopamine needed for better symptom control once off the patch, then can consider switching to sinemet 25/250 TID.

## 2019-03-13 ENCOUNTER — TELEPHONE (OUTPATIENT)
Dept: NEUROLOGY | Facility: CLINIC | Age: 79
End: 2019-03-13

## 2019-03-13 NOTE — TELEPHONE ENCOUNTER
----- Message from Tyshawn Simons sent at 3/13/2019 11:43 AM CDT -----  Pharmacy Calling    Reason for call: [facility] states [facility] are discounting 17 MG and will need to change to 34 MG    Pharmacy Name: [facility]    Prescription Name: NUPLAZID 17 MG    Phone Number: , ext [phone] (reference Nuplazid project)    Additional Information:

## 2019-03-14 DIAGNOSIS — F06.8 PSYCHOSIS DUE TO PARKINSON'S DISEASE: Primary | ICD-10-CM

## 2019-03-14 DIAGNOSIS — G20.A1 PSYCHOSIS DUE TO PARKINSON'S DISEASE: Primary | ICD-10-CM

## 2019-04-16 ENCOUNTER — OFFICE VISIT (OUTPATIENT)
Dept: NEUROLOGY | Facility: CLINIC | Age: 79
End: 2019-04-16
Payer: MEDICARE

## 2019-04-16 VITALS
DIASTOLIC BLOOD PRESSURE: 60 MMHG | SYSTOLIC BLOOD PRESSURE: 100 MMHG | BODY MASS INDEX: 32.11 KG/M2 | HEIGHT: 68 IN | HEART RATE: 64 BPM | WEIGHT: 211.88 LBS

## 2019-04-16 DIAGNOSIS — R35.0 URINARY FREQUENCY: ICD-10-CM

## 2019-04-16 DIAGNOSIS — G20.A1 PSYCHOSIS DUE TO PARKINSON'S DISEASE: ICD-10-CM

## 2019-04-16 DIAGNOSIS — G20.A1 PARKINSON DISEASE: Primary | ICD-10-CM

## 2019-04-16 DIAGNOSIS — F02.818 DEMENTIA DUE TO PARKINSON'S DISEASE WITH BEHAVIORAL DISTURBANCE: ICD-10-CM

## 2019-04-16 DIAGNOSIS — E11.42 DIABETIC POLYNEUROPATHY ASSOCIATED WITH TYPE 2 DIABETES MELLITUS: ICD-10-CM

## 2019-04-16 DIAGNOSIS — R44.3 HALLUCINATIONS: ICD-10-CM

## 2019-04-16 DIAGNOSIS — F06.8 PSYCHOSIS DUE TO PARKINSON'S DISEASE: ICD-10-CM

## 2019-04-16 DIAGNOSIS — G20.A1 DEMENTIA DUE TO PARKINSON'S DISEASE WITH BEHAVIORAL DISTURBANCE: ICD-10-CM

## 2019-04-16 PROCEDURE — 99999 PR PBB SHADOW E&M-EST. PATIENT-LVL IV: CPT | Mod: PBBFAC,GC,, | Performed by: PSYCHIATRY & NEUROLOGY

## 2019-04-16 PROCEDURE — 99213 OFFICE O/P EST LOW 20 MIN: CPT | Mod: S$PBB,GC,, | Performed by: PSYCHIATRY & NEUROLOGY

## 2019-04-16 PROCEDURE — 99999 PR PBB SHADOW E&M-EST. PATIENT-LVL IV: ICD-10-PCS | Mod: PBBFAC,GC,, | Performed by: PSYCHIATRY & NEUROLOGY

## 2019-04-16 PROCEDURE — 99213 PR OFFICE/OUTPT VISIT, EST, LEVL III, 20-29 MIN: ICD-10-PCS | Mod: S$PBB,GC,, | Performed by: PSYCHIATRY & NEUROLOGY

## 2019-04-16 PROCEDURE — 99214 OFFICE O/P EST MOD 30 MIN: CPT | Mod: PBBFAC | Performed by: PSYCHIATRY & NEUROLOGY

## 2019-04-16 NOTE — PROGRESS NOTES
Patient Name: Danelle Frankel  MRN: [id number]    CC: PD    Interval history (4/16/19)  Patient is brought to clinic today by his friend as his wife had surgery recently. Patient is still having hallucinations - recently thought he was somewhere else when he was home. Otherwise, sees animals at home intermittently. Does not happen anymore than 2-3X/month. Not bothersome to the patient or wife at this time. Still on neupro 2mg, has about 90 days left.     Interval history (1/2019)  Since last visit, patient had episodes hallucinations infrequently, 2-3X/month. Most of them involving him seeing babies or animals in the room. Patient has insight. Continues to be on neupro patch 2mg every other day. Gait stable. Uses a walker for long distances or outside home but otherwise not at home    Interval history (10/2018):  Since last visit, patient transitioned off stalevo to sinemet 25/100 (2 tablets) TID. Continues to have intermittent hallucinations but reports that it is much improved compared to prior. Wife also reports that the walking has improved some. Continues to be on the neupro patch 2mg - however wife states that she might sometimes forget to change the patches daily and usually only remembers when she gives patient a bath every 1-2 days.     HPI: Danelle Frankel is a 78 y.o. male with PD for 8-10 years back, DM, HTN, CAD s/p CABG, peripheral neuropathy presents to neurology clinic for management of PD. Patient was previously treated by Dr. Arriaga, then in resident clinic. Over the course of the years, wife reports coverage of multiple medications because of cost - these per chart review included azilect, neupro. Because of hallucinations, his requip was reduced from 8mg to 2mg over first few visits and he was changed from sinemet +comtan to stalevo. Wife reports that hallucinations worsened when he started stalevo, so wife cut the mid-day dose in half which reduced psychosis. Nuplazid was started after last  visit for hallucinations with recommended dose of 34mg however patient only on 17mg qd as wife feels that increased dose did not make any difference but 17mg qd did seem to help reduce frequency of hallucinations. Currently patient on neupro - samples of 2mg. In the past reported that higher doses did seem to help him walk but had hallucinations. Patient is currently able to ambulate without assistance for short distances but otherwise uses a wheelchair or walker to ambulate longer distances  No reported dyskinesias.     Premotor sx's- +anosmia, + constipation, + REM behavior issues- acting out dreams, vivid dreams.     ROS:   Review of Systems   Constitutional: Negative for malaise/fatigue. Negative for weight loss.   HENT: Negative for hearing loss.   Eyes: Negative for blurred vision and double vision.   Respiratory: Negative for shortness of breath and stridor.   Cardiovascular: Negative for chest pain and palpitations.   Gastrointestinal: Negative for nausea, vomiting and constipation.   Genitourinary: Negative for frequency. Negative for urgency.   Musculoskeletal: Negative for joint pain.   Skin: Negative for rash.   Neurological: Negative for focal weakness and seizures.   Endo/Heme/Allergies: Does not bruise/bleed easily.       Past Medical History  Past Medical History:   Diagnosis Date    Cataract     Coronary artery disease     Dementia     Diabetes mellitus, type 2     Dyslipidemia     GERD (gastroesophageal reflux disease)     Gout     Hyperlipidemia     Hypertension     Parkinson disease     Venous insufficiency        Medications    Current Outpatient Medications:     allopurinol (ZYLOPRIM) 300 MG tablet, TAKE ONE TABLET BY MOUTH DAILY, Disp: 90 tablet, Rfl: 3    aspirin (ECOTRIN) 81 MG EC tablet, Take 81 mg by mouth once daily., Disp: , Rfl:     calcium carbonate (OS-YANG) 600 mg calcium (1,500 mg) Tab, Take 600 mg by mouth once., Disp: , Rfl:     carbidopa-levodopa  mg (SINEMET)   mg per tablet, Take 2 tablets by mouth 3 (three) times daily., Disp: 180 tablet, Rfl: 3    finasteride (PROSCAR) 5 mg tablet, Take 5 mg by mouth once daily., Disp: , Rfl:     fluocinonide (LIDEX) 0.05 % external solution, USE ONE APPLICATION TOPICALLY TO RASH ON SCALP AS NEEDED FLARE EVERY DAY -DO NOT USE ON FACE, GROIN OR FLEXURES., Disp: , Rfl: 0    FLUZONE HIGH-DOSE 2018-19, PF, 180 mcg/0.5 mL vaccine, ADM 0.5ML IM UTD, Disp: , Rfl: 0    hydrocortisone 2.5 % cream, APPLY TO AFFECTED AREA TWICE DAILY for rash flare ups, Disp: , Rfl: 3    isosorbide mononitrate (IMDUR) 30 MG 24 hr tablet, Take 30 mg by mouth 2 (two) times daily., Disp: , Rfl:     ketoconazole (NIZORAL) 2 % cream, 1 application 2 (two) times daily., Disp: , Rfl: 11    lisinopril (PRINIVIL,ZESTRIL) 2.5 MG tablet, Take 2.5 mg by mouth once daily., Disp: , Rfl:     memantine (NAMENDA) 10 MG Tab, Take 1 tablet (10 mg total) by mouth 2 (two) times daily., Disp: 60 tablet, Rfl: 6    metFORMIN (GLUCOPHAGE-XR) 500 MG 24 hr tablet, Take 2 tablets (1,000 mg total) by mouth once daily., Disp: 60 tablet, Rfl: 11    metoprolol succinate (TOPROL-XL) 25 MG 24 hr tablet, Take 25 mg by mouth once daily., Disp: , Rfl:     nitroGLYCERIN (NITROSTAT) 0.4 MG SL tablet, 1 UNDER TONGUE FOR CHEST PAIN AS NEEDED-MAY REPEAT IN 5 MINS MAX 3 TABS IN 15 MINUTES, Disp: , Rfl: 1    omeprazole (PRILOSEC) 20 MG capsule, Take 20 mg by mouth once daily., Disp: , Rfl:     pimavanserin (NUPLAZID) 34 mg Cap, Take 1 capsule by mouth once daily., Disp: 30 capsule, Rfl: 11    RESTASIS 0.05 % ophthalmic emulsion, INSTILL (PUT) 1 DROP IN EACH EYE TWICE DAILY, Disp: , Rfl: 4    rivastigmine tartrate 4.5 mg capsule, Take 1 capsule (4.5 mg total) by mouth 2 (two) times daily., Disp: 60 capsule, Rfl: 5    simvastatin (ZOCOR) 80 MG tablet, Take 80 mg by mouth every evening., Disp: , Rfl:     tamsulosin (FLOMAX) 0.4 mg Cp24, Take 0.4 mg by mouth once daily., Disp: , Rfl:     " tiZANidine (ZANAFLEX) 4 MG tablet, TAKE ONE TABLET BY MOUTH EVERY EVENING, Disp: 30 tablet, Rfl: 3    venlafaxine (EFFEXOR) 75 MG tablet, Take 1 tablet (75 mg total) by mouth once daily., Disp: 30 tablet, Rfl: 6    vitamin D 1000 units Tab, Take 2,000 Units by mouth once daily., Disp: , Rfl:     Allergies  Review of patient's allergies indicates:   Allergen Reactions    Codeine        Social History  Social History     Socioeconomic History    Marital status:      Spouse name: Not on file    Number of children: 4    Years of education: Not on file    Highest education level: Not on file   Occupational History    Not on file   Social Needs    Financial resource strain: Not on file    Food insecurity:     Worry: Not on file     Inability: Not on file    Transportation needs:     Medical: Not on file     Non-medical: Not on file   Tobacco Use    Smoking status: Never Smoker    Smokeless tobacco: Former User     Types: Chew   Substance and Sexual Activity    Alcohol use: No    Drug use: No    Sexual activity: Not on file   Lifestyle    Physical activity:     Days per week: Not on file     Minutes per session: Not on file    Stress: Not on file   Relationships    Social connections:     Talks on phone: Not on file     Gets together: Not on file     Attends Y[employer] service: Not on file     Active member of club or organization: Not on file     Attends meetings of clubs or organizations: Not on file     Relationship status: Not on file   Other Topics Concern    Not on file   Social History Narrative    Not on file       Family History  Family History   Problem Relation Age of Onset    Diabetes Mother     Cancer Mother     Cancer Father     Cancer Sister     Stroke Brother        Physical Exam  /60   Pulse 64   Ht 5' 8" (1.727 m)   Wt 96.1 kg (211 lb 13.8 oz)   BMI 32.21 kg/m²     General appearance: Well-developed, well-groomed.     Neurologic Exam: The patient is awake, alert " and oriented.     Cranial nerves: pupils are round and reactive to light and accommodation. Visual fields are full to confrontation. Ocular motility is full in all cardinal positions of gaze. Facial sensation is normal to pinprick and light touch. Facial activation is symmetric. Hearing is normal bilaterally. Palate elevates symmetrically and gag reflex is intact bilaterally. Shoulder elevation is symmetric and full strength bilaterally. Tongue is midline and neck rotation strength is normal bilaterally. Neck range of motion is normal.     Motor examination of all extremities demonstrates normal bulk. Increased tone in all 4 extremities. Strength is 5/5 in the upper and lower extremities bilaterally without pronator drift.     Sensory examination is decreased to pinprick, vibration and proprioception distally in the lower extremities and improves proximally.     Deep tendon reflexes are 2+ and symmetric in the upper and lower extremities bilaterally.     Coordination: Finger to nose and heel to shin testing is normal in both upper and lower extremities.     Movement exam: +bradykinesia b/l, + cogwheel rigidity, no notable resting tremor. +shuffling gait, turn en bloc. +hypomimia      Lab and Test Results    WBC   Date Value Ref Range Status   01/24/2018 10.95 3.90 - 12.70 K/uL Final     Hemoglobin   Date Value Ref Range Status   01/24/2018 12.8 (L) 14.0 - 18.0 g/dL Final     Hematocrit   Date Value Ref Range Status   01/24/2018 38.9 (L) 40.0 - 54.0 % Final     Platelets   Date Value Ref Range Status   01/24/2018 225 150 - 350 K/uL Final     No results found for: GLU, NA, K, CL, CO2, BUN, CREATININE, CALCIUM, MG, PHOS  No results found for: ALB, ALKPHOS, ALT, AST      Images:     None    Assessment and Plan    Danelle Frankel is a 78 y.o. male with PD.     Problem List Items Addressed This Visit        1 - High    Parkinson disease - Primary    Overview     2010 - PD, rigid-type.          Current Assessment & Plan  "    Symptoms currently stable on sinemet 25/100 (2 tablets) TID and neupro 2mg.   Unsure if the neupro patch is providing much benefit as it is not being changed daily.   Wife would like to continue with existing supply of the neupro patch. Once that is done, In the future, if increased dopamine needed for better symptom control once off the patch, then can consider switching to sinemet 25/250 TID.             2     Hallucinations    Overview     Levodopa, comtan/neupro induced. Also could be seen with PD dementia         Current Assessment & Plan     Still has hallucinations but infrequent and not bothersome to patient or his wife.   Will continue current regimen for dopaminergics as mentioned above  Continue current dose of nuplazid 17mg qd for now.   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

## 2019-04-21 RX ORDER — MEMANTINE HYDROCHLORIDE 10 MG/1
10 TABLET ORAL 2 TIMES DAILY
Qty: 60 TABLET | Refills: 6 | Status: SHIPPED | OUTPATIENT
Start: 2019-04-21 | End: 2019-07-16 | Stop reason: SDUPTHER

## 2019-04-21 RX ORDER — VENLAFAXINE 75 MG/1
75 TABLET ORAL DAILY
Qty: 30 TABLET | Refills: 6 | Status: SHIPPED | OUTPATIENT
Start: 2019-04-21 | End: 2019-07-16 | Stop reason: SDUPTHER

## 2019-04-21 RX ORDER — CARBIDOPA AND LEVODOPA 25; 100 MG/1; MG/1
2 TABLET ORAL 3 TIMES DAILY
Qty: 180 TABLET | Refills: 3 | Status: SHIPPED | OUTPATIENT
Start: 2019-04-21 | End: 2019-07-16 | Stop reason: SDUPTHER

## 2019-04-21 RX ORDER — RIVASTIGMINE TARTRATE 4.5 MG/1
4.5 CAPSULE ORAL DAILY
Qty: 60 CAPSULE | Refills: 5 | Status: SHIPPED | OUTPATIENT
Start: 2019-04-21 | End: 2019-07-16 | Stop reason: SDUPTHER

## 2019-05-08 NOTE — PROGRESS NOTES
I have reviewed the notes, assessments, and/or procedures performed by Dr Garduno, I concur with her/his documentation of Danelle Frankel.

## 2019-06-05 ENCOUNTER — TELEPHONE (OUTPATIENT)
Dept: NEUROLOGY | Facility: CLINIC | Age: 79
End: 2019-06-05

## 2019-06-05 NOTE — TELEPHONE ENCOUNTER
Spoke to the wife. I think she wanted the patient to see you before you leave. I advised her that an appointment would be scheduled for the patient in July or she can call back at the end of June.

## 2019-07-16 ENCOUNTER — OFFICE VISIT (OUTPATIENT)
Dept: NEUROLOGY | Facility: CLINIC | Age: 79
End: 2019-07-16
Payer: MEDICARE

## 2019-07-16 VITALS
BODY MASS INDEX: 32.41 KG/M2 | WEIGHT: 213.88 LBS | HEART RATE: 54 BPM | SYSTOLIC BLOOD PRESSURE: 119 MMHG | DIASTOLIC BLOOD PRESSURE: 66 MMHG | HEIGHT: 68 IN

## 2019-07-16 DIAGNOSIS — G20.A1 DEMENTIA DUE TO PARKINSON'S DISEASE WITH BEHAVIORAL DISTURBANCE: ICD-10-CM

## 2019-07-16 DIAGNOSIS — G89.29 CHRONIC BILATERAL LOW BACK PAIN WITHOUT SCIATICA: ICD-10-CM

## 2019-07-16 DIAGNOSIS — E11.42 DIABETIC POLYNEUROPATHY ASSOCIATED WITH TYPE 2 DIABETES MELLITUS: ICD-10-CM

## 2019-07-16 DIAGNOSIS — R44.3 HALLUCINATIONS: ICD-10-CM

## 2019-07-16 DIAGNOSIS — F06.8 PSYCHOSIS DUE TO PARKINSON'S DISEASE: ICD-10-CM

## 2019-07-16 DIAGNOSIS — G20.A1 PARKINSON DISEASE: Primary | ICD-10-CM

## 2019-07-16 DIAGNOSIS — M54.50 CHRONIC BILATERAL LOW BACK PAIN WITHOUT SCIATICA: ICD-10-CM

## 2019-07-16 DIAGNOSIS — G20.A1 PSYCHOSIS DUE TO PARKINSON'S DISEASE: ICD-10-CM

## 2019-07-16 DIAGNOSIS — F02.818 DEMENTIA DUE TO PARKINSON'S DISEASE WITH BEHAVIORAL DISTURBANCE: ICD-10-CM

## 2019-07-16 PROCEDURE — 99214 OFFICE O/P EST MOD 30 MIN: CPT | Mod: PBBFAC | Performed by: STUDENT IN AN ORGANIZED HEALTH CARE EDUCATION/TRAINING PROGRAM

## 2019-07-16 PROCEDURE — 99999 PR PBB SHADOW E&M-EST. PATIENT-LVL IV: CPT | Mod: PBBFAC,GC,, | Performed by: STUDENT IN AN ORGANIZED HEALTH CARE EDUCATION/TRAINING PROGRAM

## 2019-07-16 PROCEDURE — 99215 PR OFFICE/OUTPT VISIT, EST, LEVL V, 40-54 MIN: ICD-10-PCS | Mod: S$PBB,GC,, | Performed by: PSYCHIATRY & NEUROLOGY

## 2019-07-16 PROCEDURE — 99999 PR PBB SHADOW E&M-EST. PATIENT-LVL IV: ICD-10-PCS | Mod: PBBFAC,GC,, | Performed by: STUDENT IN AN ORGANIZED HEALTH CARE EDUCATION/TRAINING PROGRAM

## 2019-07-16 PROCEDURE — 99215 OFFICE O/P EST HI 40 MIN: CPT | Mod: S$PBB,GC,, | Performed by: PSYCHIATRY & NEUROLOGY

## 2019-07-16 RX ORDER — GABAPENTIN 300 MG/1
300 CAPSULE ORAL NIGHTLY
Qty: 30 CAPSULE | Refills: 11 | Status: SHIPPED | OUTPATIENT
Start: 2019-07-16 | End: 2020-01-01 | Stop reason: SDUPTHER

## 2019-07-16 RX ORDER — RIVASTIGMINE TARTRATE 4.5 MG/1
4.5 CAPSULE ORAL DAILY
Qty: 60 CAPSULE | Refills: 5 | Status: SHIPPED | OUTPATIENT
Start: 2019-07-16 | End: 2019-07-18

## 2019-07-16 RX ORDER — VENLAFAXINE 75 MG/1
75 TABLET ORAL DAILY
Qty: 30 TABLET | Refills: 6 | Status: SHIPPED | OUTPATIENT
Start: 2019-07-16 | End: 2020-01-01 | Stop reason: SDUPTHER

## 2019-07-16 RX ORDER — MEMANTINE HYDROCHLORIDE 10 MG/1
10 TABLET ORAL 2 TIMES DAILY
Qty: 60 TABLET | Refills: 6 | Status: SHIPPED | OUTPATIENT
Start: 2019-07-16 | End: 2020-01-01 | Stop reason: SDUPTHER

## 2019-07-16 RX ORDER — CARBIDOPA AND LEVODOPA 25; 100 MG/1; MG/1
2 TABLET ORAL 3 TIMES DAILY
Qty: 180 TABLET | Refills: 3 | Status: SHIPPED | OUTPATIENT
Start: 2019-07-16 | End: 2020-01-01 | Stop reason: SDUPTHER

## 2019-07-16 NOTE — ASSESSMENT & PLAN NOTE
- Hallucination frequency has decreased to once every few weeks  - Not intrusive or anxiety inducing, manageable per wife  - Continue Nuplazid 34mg (had initially been prescribed 17mg but this dose was unavailable and capsule could not be split)

## 2019-07-16 NOTE — ASSESSMENT & PLAN NOTE
- Neuropathic pain described as burning likely secondary to diabetic neuropathy   - Continue Venlafaxine  - Gabapentin 300 QD

## 2019-07-16 NOTE — PROGRESS NOTES
Neurology Clinic  Progress Note    Patient Name: Danelle Frankel  MRN: [id number]    CC: PD    Interval History (7/16/19)  Pt is accompanied by wife to clinic who states that pt has been doing well in interval. Hallucinations have decreased since being started on Nuplazid (34 mg). Hallucinations now occur once every few weeks and do not cause anxiety or interfere with daily activity. Pt does states that he has had several mechanical falls but attributes these to not using his walker as much as he should. Remains on Neupro 2mg patches, wife states that they have 4-5 months remaining and are unsure if they will be able to afford them after sample amount runs out. Pt has complaints of restless less syndrome at night that wakes him from sleep. Describes burning pain in his lower extremities that has improved on Venlafaxine.     Interval History (4/16/19)  Patient is brought to clinic today by his friend as his wife had surgery recently. Patient is still having hallucinations - recently thought he was somewhere else when he was home. Otherwise, sees animals at home intermittently. Does not happen anymore than 2-3X/month. Not bothersome to the patient or wife at this time. Still on neupro 2mg, has about 90 days left.     Interval History (1/2019)  Since last visit, patient had episodes hallucinations infrequently, 2-3X/month. Most of them involving him seeing babies or animals in the room. Patient has insight. Continues to be on neupro patch 2mg every other day. Gait stable. Uses a walker for long distances or outside home but otherwise not at home    Interval History (10/2018):  Since last visit, patient transitioned off stalevo to sinemet 25/100 (2 tablets) TID. Continues to have intermittent hallucinations but reports that it is much improved compared to prior. Wife also reports that the walking has improved some. Continues to be on the neupro patch 2mg - however wife states that she might sometimes forget to change the  patches daily and usually only remembers when she gives patient a bath every 1-2 days.     HPI: Danelle Frankel is a 78 y.o. male with PD for 8-10 years back, DM, HTN, CAD s/p CABG, peripheral neuropathy presents to neurology clinic for management of PD. Patient was previously treated by Dr. Arriaga, then in resident clinic. Over the course of the years, wife reports coverage of multiple medications because of cost - these per chart review included azilect, neupro. Because of hallucinations, his requip was reduced from 8mg to 2mg over first few visits and he was changed from sinemet +comtan to stalevo. Wife reports that hallucinations worsened when he started stalevo, so wife cut the mid-day dose in half which reduced psychosis. Nuplazid was started after last visit for hallucinations with recommended dose of 34mg however patient only on 17mg qd as wife feels that increased dose did not make any difference but 17mg qd did seem to help reduce frequency of hallucinations. Currently patient on neupro - samples of 2mg. In the past reported that higher doses did seem to help him walk but had hallucinations. Patient is currently able to ambulate without assistance for short distances but otherwise uses a wheelchair or walker to ambulate longer distances  No reported dyskinesias.     Premotor sx's- +anosmia, + constipation, + REM behavior issues- acting out dreams, vivid dreams.     ROS:   Review of Systems   Constitutional: Negative for malaise/fatigue. Negative for weight loss.   HENT: Negative for hearing loss.   Eyes: Negative for blurred vision and double vision.   Respiratory: Negative for shortness of breath and stridor.   Cardiovascular: Negative for chest pain and palpitations.   Gastrointestinal: Negative for nausea, vomiting and constipation.   Genitourinary: Negative for frequency. Negative for urgency.   Musculoskeletal: Negative for joint pain.   Skin: Negative for rash.   Neurological: Negative for focal  weakness and seizures.   Endo/Heme/Allergies: Does not bruise/bleed easily.       Past Medical History  Past Medical History:   Diagnosis Date    Cataract     Coronary artery disease     Dementia     Diabetes mellitus, type 2     Dyslipidemia     GERD (gastroesophageal reflux disease)     Gout     Hyperlipidemia     Hypertension     Parkinson disease     Venous insufficiency        Medications    Current Outpatient Medications:     allopurinol (ZYLOPRIM) 300 MG tablet, TAKE ONE TABLET BY MOUTH DAILY, Disp: 90 tablet, Rfl: 3    aspirin (ECOTRIN) 81 MG EC tablet, Take 81 mg by mouth once daily., Disp: , Rfl:     calcium carbonate (OS-YANG) 600 mg calcium (1,500 mg) Tab, Take 600 mg by mouth once., Disp: , Rfl:     carbidopa-levodopa  mg (SINEMET)  mg per tablet, Take 2 tablets by mouth 3 (three) times daily., Disp: 180 tablet, Rfl: 3    finasteride (PROSCAR) 5 mg tablet, Take 5 mg by mouth once daily., Disp: , Rfl:     fluocinonide (LIDEX) 0.05 % external solution, USE ONE APPLICATION TOPICALLY TO RASH ON SCALP AS NEEDED FLARE EVERY DAY -DO NOT USE ON FACE, GROIN OR FLEXURES., Disp: , Rfl: 0    FLUZONE HIGH-DOSE 2018-19, PF, 180 mcg/0.5 mL vaccine, ADM 0.5ML IM UTD, Disp: , Rfl: 0    gabapentin (NEURONTIN) 300 MG capsule, Take 1 capsule (300 mg total) by mouth every evening., Disp: 30 capsule, Rfl: 11    hydrocortisone 2.5 % cream, APPLY TO AFFECTED AREA TWICE DAILY for rash flare ups, Disp: , Rfl: 3    isosorbide mononitrate (IMDUR) 30 MG 24 hr tablet, Take 30 mg by mouth 2 (two) times daily., Disp: , Rfl:     ketoconazole (NIZORAL) 2 % cream, 1 application 2 (two) times daily., Disp: , Rfl: 11    lisinopril (PRINIVIL,ZESTRIL) 2.5 MG tablet, Take 2.5 mg by mouth once daily., Disp: , Rfl:     memantine (NAMENDA) 10 MG Tab, Take 1 tablet (10 mg total) by mouth 2 (two) times daily., Disp: 60 tablet, Rfl: 6    metFORMIN (GLUCOPHAGE-XR) 500 MG 24 hr tablet, Take 2 tablets (1,000 mg  total) by mouth once daily., Disp: 60 tablet, Rfl: 11    metoprolol succinate (TOPROL-XL) 25 MG 24 hr tablet, Take 25 mg by mouth once daily., Disp: , Rfl:     nitroGLYCERIN (NITROSTAT) 0.4 MG SL tablet, 1 UNDER TONGUE FOR CHEST PAIN AS NEEDED-MAY REPEAT IN 5 MINS MAX 3 TABS IN 15 MINUTES, Disp: , Rfl: 1    omeprazole (PRILOSEC) 20 MG capsule, Take 20 mg by mouth once daily., Disp: , Rfl:     pimavanserin (NUPLAZID) 34 mg Cap, Take 1 capsule by mouth once daily., Disp: 30 capsule, Rfl: 11    RESTASIS 0.05 % ophthalmic emulsion, INSTILL (PUT) 1 DROP IN EACH EYE TWICE DAILY, Disp: , Rfl: 4    rivastigmine tartrate 4.5 mg capsule, Take 1 capsule (4.5 mg total) by mouth once daily., Disp: 60 capsule, Rfl: 5    simvastatin (ZOCOR) 80 MG tablet, Take 80 mg by mouth every evening., Disp: , Rfl:     tamsulosin (FLOMAX) 0.4 mg Cp24, Take 0.4 mg by mouth once daily., Disp: , Rfl:     tiZANidine (ZANAFLEX) 4 MG tablet, TAKE ONE TABLET BY MOUTH EVERY EVENING, Disp: 30 tablet, Rfl: 3    venlafaxine (EFFEXOR) 75 MG tablet, Take 1 tablet (75 mg total) by mouth once daily., Disp: 30 tablet, Rfl: 6    vitamin D 1000 units Tab, Take 2,000 Units by mouth once daily., Disp: , Rfl:     Allergies  Review of patient's allergies indicates:   Allergen Reactions    Codeine        Social History  Social History     Socioeconomic History    Marital status:      Spouse name: Not on file    Number of children: 4    Years of education: Not on file    Highest education level: Not on file   Occupational History    Not on file   Social Needs    Financial resource strain: Not on file    Food insecurity:     Worry: Not on file     Inability: Not on file    Transportation needs:     Medical: Not on file     Non-medical: Not on file   Tobacco Use    Smoking status: Never Smoker    Smokeless tobacco: Former User     Types: Chew   Substance and Sexual Activity    Alcohol use: No    Drug use: No    Sexual activity: Not on  "file   Lifestyle    Physical activity:     Days per week: Not on file     Minutes per session: Not on file    Stress: Not on file   Relationships    Social connections:     Talks on phone: Not on file     Gets together: Not on file     Attends Confucianism service: Not on file     Active member of club or organization: Not on file     Attends meetings of clubs or organizations: Not on file     Relationship status: Not on file   Other Topics Concern    Not on file   Social History Narrative    Not on file       Family History  Family History   Problem Relation Age of Onset    Diabetes Mother     Cancer Mother     Cancer Father     Cancer Sister     Stroke Brother        Physical Exam  /66   Pulse (!) 54   Ht 5' 8" (1.727 m)   Wt 97 kg (213 lb 13.5 oz)   BMI 32.52 kg/m²     General appearance: Well-developed, well-groomed.     Neurologic Exam: The patient is awake, alert and oriented.     Cranial nerves: Pupils are round and reactive to light and accommodation. Visual fields are full to confrontation. Ocular motility is full in all cardinal positions of gaze. Facial sensation is normal to pinprick and light touch. Facial activation is symmetric. Hearing is normal bilaterally. Palate elevates symmetrically and gag reflex is intact bilaterally. Shoulder elevation is symmetric and full strength bilaterally. Tongue is midline and neck rotation strength is normal bilaterally. Neck range of motion is normal.     Motor examination of all extremities demonstrates normal bulk. Increased tone in all 4 extremities with cogwheel rigidity. Strength is 5/5 in the upper and lower extremities bilaterally without pronator drift.     Sensory examination is decreased to pinprick, vibration and proprioception distally in the lower extremities and improves proximally.      Deep tendon reflexes are 2+ and symmetric in the upper and lower extremities bilaterally.     Coordination: Finger to nose and heel to shin testing is " normal in both upper and lower extremities.     Movement exam: +bradykinesia b/l, + cogwheel rigidity, no notable resting tremor. +shuffling gait, turn en bloc. +hypomimia      Lab and Test Results    WBC   Date Value Ref Range Status   06/07/2019 12.50 3.90 - 12.70 K/uL Final   01/24/2018 10.95 3.90 - 12.70 K/uL Final     Hemoglobin   Date Value Ref Range Status   06/07/2019 13.0 (L) 14.0 - 18.0 g/dL Final   01/24/2018 12.8 (L) 14.0 - 18.0 g/dL Final     Hematocrit   Date Value Ref Range Status   06/07/2019 40.0 40.0 - 54.0 % Final   01/24/2018 38.9 (L) 40.0 - 54.0 % Final     Platelets   Date Value Ref Range Status   06/07/2019 229 150 - 350 K/uL Final   01/24/2018 225 150 - 350 K/uL Final     Glucose   Date Value Ref Range Status   06/07/2019 103 74 - 106 mg/dL Final     Sodium   Date Value Ref Range Status   06/07/2019 142 136 - 145 mmol/L Final     Potassium   Date Value Ref Range Status   06/07/2019 4.4 3.5 - 5.1 mmol/L Final     Chloride   Date Value Ref Range Status   06/07/2019 105 95 - 110 mmol/L Final     CO2   Date Value Ref Range Status   06/07/2019 28 23 - 29 mmol/L Final     BUN, Bld   Date Value Ref Range Status   06/07/2019 25 (H) 9 - 20 mg/dL Final     Creatinine   Date Value Ref Range Status   06/07/2019 0.80 0.80 - 1.50 mg/dL Final     Calcium   Date Value Ref Range Status   06/07/2019 8.9 8.4 - 10.2 mg/dL Final     Alkaline Phosphatase   Date Value Ref Range Status   06/07/2019 53 38 - 145 U/L Final     ALT   Date Value Ref Range Status   06/07/2019 6 (L) 10 - 44 U/L Final     AST   Date Value Ref Range Status   06/07/2019 18 17 - 59 U/L Final         Images:     None    Assessment and Plan    Danelle Frankel is a 78 y.o. male with PD.     Problem List Items Addressed This Visit        Neuro    Parkinson disease - Primary    Overview     2010 - PD, rigid-type.          Current Assessment & Plan     - Symptoms are currently stable on medication regimen  - Continue Sinemet 25/100 (2 tablets),  Nuplazid 34mg QD, Memantine 10mg and Rivastigmine 4.5mg  - Ambulatory referral to physical therapy   - Remains on Rotigotine patch 2mg QD, wife states that she has a few months left, unsure of affordability after samples obtained from outside neurologist run out. Can consider increasing Sinemet at next appointment.          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    Overview     Hallucinations - likely levodopa related         Current Assessment & Plan     Hallucinations         Diabetic polyneuropathy associated with type 2 diabetes mellitus    Current Assessment & Plan     - Neuropathic pain described as burning likely secondary to diabetic neuropathy   - Continue Venlafaxine  - Gabapentin 300 QD            Orthopedic    Chronic low back pain without sciatica    Current Assessment & Plan     - Currently on Venlafaxine for diabetic neuropathy   - Trial of Gabapentin 300mg QD  - OTC capsaicin creams   - Outside referral to physical therapy   - Will reassess effectiveness at next clinic appointment             Other    Hallucinations    Overview     Levodopa, comtan/neupro induced. Also could be seen with PD dementia         Current Assessment & Plan     - Hallucination frequency has decreased to once every few weeks  - Not intrusive or anxiety inducing, manageable per wife  - Continue Nuplazid 34mg (had initially been prescribed 17mg but this dose was unavailable and capsule could not be split)              Follow up in 6 months.        James Naik MD, MPH  Neurology Resident - PGY 2  Department of Neurology  [facility] [place] [address]

## 2019-07-16 NOTE — ASSESSMENT & PLAN NOTE
- Currently on Venlafaxine for diabetic neuropathy   - Trial of Gabapentin 300mg QD  - OTC capsaicin creams   - Outside referral to physical therapy   - Will reassess effectiveness at next clinic appointment

## 2019-07-17 ENCOUNTER — TELEPHONE (OUTPATIENT)
Dept: NEUROLOGY | Facility: CLINIC | Age: 79
End: 2019-07-17

## 2019-07-17 DIAGNOSIS — G20.A1 PARKINSON DISEASE: ICD-10-CM

## 2019-07-17 NOTE — TELEPHONE ENCOUNTER
Pharmacy is calling to clarify the patients rivastigmine tartrate. Pharmacy states the patient was taking 2 capsules. Please advise.

## 2019-07-18 RX ORDER — RIVASTIGMINE TARTRATE 4.5 MG/1
4.5 CAPSULE ORAL 2 TIMES DAILY
Qty: 60 CAPSULE | Refills: 5 | Status: SHIPPED | OUTPATIENT
Start: 2019-07-18 | End: 2020-01-01

## 2020-01-01 ENCOUNTER — OFFICE VISIT (OUTPATIENT)
Dept: WOUND CARE | Facility: HOSPITAL | Age: 80
End: 2020-01-01
Attending: SURGERY
Payer: MEDICARE

## 2020-01-01 VITALS — TEMPERATURE: 98 F | SYSTOLIC BLOOD PRESSURE: 123 MMHG | HEART RATE: 67 BPM | DIASTOLIC BLOOD PRESSURE: 72 MMHG

## 2020-01-01 VITALS
HEART RATE: 48 BPM | TEMPERATURE: 98 F | DIASTOLIC BLOOD PRESSURE: 82 MMHG | RESPIRATION RATE: 20 BRPM | SYSTOLIC BLOOD PRESSURE: 151 MMHG

## 2020-01-01 VITALS — SYSTOLIC BLOOD PRESSURE: 118 MMHG | DIASTOLIC BLOOD PRESSURE: 72 MMHG | TEMPERATURE: 98 F | HEART RATE: 57 BPM

## 2020-01-01 DIAGNOSIS — G20.A1 PARKINSON DISEASE: Primary | ICD-10-CM

## 2020-01-01 DIAGNOSIS — L89.312 PRESSURE INJURY OF RIGHT BUTTOCK, STAGE 2: ICD-10-CM

## 2020-01-01 DIAGNOSIS — G20.A1 PARKINSON DISEASE: ICD-10-CM

## 2020-01-01 DIAGNOSIS — L89.513 PRESSURE ULCER OF RIGHT ANKLE, STAGE 3: ICD-10-CM

## 2020-01-01 DIAGNOSIS — L89.152 PRESSURE INJURY OF SACRAL REGION, STAGE 2: ICD-10-CM

## 2020-01-01 DIAGNOSIS — F02.80 DEMENTIA IN PARKINSON'S DISEASE: ICD-10-CM

## 2020-01-01 DIAGNOSIS — L89.322 PRESSURE INJURY OF LEFT BUTTOCK, STAGE 2: ICD-10-CM

## 2020-01-01 DIAGNOSIS — G20.A1 DEMENTIA IN PARKINSON'S DISEASE: ICD-10-CM

## 2020-01-01 PROCEDURE — 11042 DBRDMT SUBQ TIS 1ST 20SQCM/<: CPT

## 2020-01-01 PROCEDURE — 99499 UNLISTED E&M SERVICE: CPT | Mod: ,,, | Performed by: SURGERY

## 2020-01-01 PROCEDURE — 27201912 HC WOUND CARE DEBRIDEMENT SUPPLIES

## 2020-01-01 PROCEDURE — 99499 NO LOS: ICD-10-PCS | Mod: ,,, | Performed by: SURGERY

## 2020-01-01 PROCEDURE — 99214 OFFICE O/P EST MOD 30 MIN: CPT | Mod: 25

## 2020-01-01 PROCEDURE — 97597 DBRDMT OPN WND 1ST 20 CM/<: CPT | Mod: 59

## 2020-01-01 RX ORDER — CARBIDOPA AND LEVODOPA 25; 100 MG/1; MG/1
2 TABLET ORAL 3 TIMES DAILY
Qty: 180 TABLET | Refills: 3 | Status: SHIPPED | OUTPATIENT
Start: 2020-01-01 | End: 2020-01-01 | Stop reason: SDUPTHER

## 2020-01-31 NOTE — TELEPHONE ENCOUNTER
----- Message from Micaela Scott sent at 1/31/2020 10:15 AM CST -----  Contact: [facility] pharmacy at [phone]//Giselle  Rx Refill/Request     Is this a Refill or New Rx:  refill  Rx Name and Strength:  Carbidopa-levodopa  25-100mg  Qty 180  Preferred Pharmacy with phone number: [facility] pharmacy at [phone] Preference:call  Additional Information:

## 2020-02-24 PROBLEM — G45.9 TIA (TRANSIENT ISCHEMIC ATTACK): Status: ACTIVE | Noted: 2020-01-01

## 2020-04-20 PROBLEM — L89.513 PRESSURE ULCER OF RIGHT ANKLE, STAGE 3: Status: ACTIVE | Noted: 2020-01-01

## 2020-04-20 PROBLEM — L89.322 PRESSURE INJURY OF LEFT BUTTOCK, STAGE 2: Status: ACTIVE | Noted: 2020-01-01

## 2020-04-20 PROBLEM — L89.312 PRESSURE INJURY OF RIGHT BUTTOCK, STAGE 2: Status: ACTIVE | Noted: 2020-01-01

## 2020-04-20 PROBLEM — L89.152 PRESSURE INJURY OF SACRAL REGION, STAGE 2: Status: ACTIVE | Noted: 2020-01-01

## 2020-04-20 NOTE — ASSESSMENT & PLAN NOTE
Excisional debridement performed of nonviable tissue.  Begin Santyl daily.  I had a long discussion with the patient's wife regarding offloading the right ankle.  I have recommended either a multi Podus boot with a kickstand or a foam wrap around the foot and ankle at night with a depression cut out for the ankle to offload the right ankle.  The patient does not have the ability to self offload the right ankle.

## 2020-04-20 NOTE — PROGRESS NOTES
[facility]  Wound Care  Progress Note    Problem List Items Addressed This Visit        Neuro    Parkinson disease - Primary    Overview     2010 - PD, rigid-type.          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  This is been present for 2-3 months prior to wound clinic visit on 04/20/2020.  The patient did see Dr. Crocker with Podiatry and was subsequently referred here for evaluation.  According to his wife, he always rolled on his right side to sleep.  As a consequence, he constantly has pressure on the right ankle when sleeping period she has tried to utilize pillows to keep the ankle off of the bed without complete success.  During the day, he has spends almost the entirety of the day in a lift chair.  Patient complains of tenderness.  There has been small amount of drainage.  There has been no fever or chills.         Current Assessment & Plan     Excisional debridement performed of nonviable tissue.  Begin Santyl daily.  I had a long discussion with the patient's wife regarding offloading the right ankle.  I have recommended either a multi Podus boot with a kickstand or a foam wrap around the foot and ankle at night with a depression cut out for the ankle to offload the right ankle.  The patient does not have the ability to self offload the right ankle.            Other    Pressure injury of sacral region, stage 2    Overview     79-year-old gentleman with Parkinson's disease and Parkinson's associated dementia with associated debilitation who was developed a wound on his sacrum and bilateral buttocks.  This is been present for at least 3 weeks prior to wound clinic visit on 04/20/2020. During the day, he  has spends almost the entirety of the day in a lift chair.  The patient is not able to turn himself due to his debilitation.  He currently has a wheelchair on order with appropriate cushions for offloading.  Patient's wife has been applying cream.  Patient denies tenderness.  There has been scant drainage.  There has been no fever or chills.           Current Assessment & Plan     Excisional debridement performed into dermis of nonviable tissue.  Patient's lift chair is most likely the culprit for the sacral and buttock wounds.  I have asked the patient's wife to limit sitting in the lift chair to 2 hr and only at times of eating.  Otherwise the patient will need to be in bed but care will have to be taken to ensure he does not apply pressure to the ankle.         Pressure injury of left buttock, stage 2    Overview     79-year-old gentleman with Parkinson's disease and Parkinson's associated dementia with associated debilitation who was developed a wound on his right lateral ankle.  This is been present for 2-3 months prior to wound clinic visit on 04/20/2020.  The patient did see Dr. Crocker with Podiatry and was subsequently referred here for evaluation.  According to his wife, he always rolled on his right side to sleep.  As a consequence, he constantly has pressure on the right ankle when sleeping period she has tried to utilize pillows to keep the ankle off of the bed without complete success.  During the day, he has spends almost the entirety of the day in a lift chair.  Patient complains of tenderness.  There has been small amount of drainage.  There has been no fever or chills.           Current Assessment & Plan     Excisional debridement performed into dermis of nonviable tissue.  Patient's lift chair is most likely the culprit for the sacral and buttock wounds.  I have asked the patient's wife to limit sitting in the lift chair to 2 hr and only at times of eating.  Otherwise the patient will need to be in  bed but care will have to be taken to ensure he does not apply pressure to the ankle.         Pressure injury of right buttock, stage 2    Overview     79-year-old gentleman with Parkinson's disease and Parkinson's associated dementia with associated debilitation who was developed a wound on his right lateral ankle.  This is been present for 2-3 months prior to wound clinic visit on 04/20/2020.  The patient did see Dr. Crocker with Podiatry and was subsequently referred here for evaluation.  According to his wife, he always rolled on his right side to sleep.  As a consequence, he constantly has pressure on the right ankle when sleeping period she has tried to utilize pillows to keep the ankle off of the bed without complete success.  During the day, he has spends almost the entirety of the day in a lift chair.  Patient complains of tenderness.  There has been small amount of drainage.  There has been no fever or chills.           Current Assessment & Plan     Excisional debridement performed into dermis of nonviable tissue.  Patient's lift chair is most likely the culprit for the sacral and buttock wounds.  I have asked the patient's wife to limit sitting in the lift chair to 2 hr and only at times of eating.  Otherwise the patient will need to be in bed but care will have to be taken to ensure he does not apply pressure to the ankle.               See wound doc progress notes. Documents will be scanned.        Jose Sheffield  [facility]

## 2020-04-20 NOTE — H&P
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  This is been present for 2-3 months prior to wound clinic visit on 04/20/2020.  The patient did see Dr. Crocker with Podiatry and was subsequently referred here for evaluation.  According to his wife, he always rolled on his right side to sleep.  As a consequence, he constantly has pressure on the right ankle when sleeping period she has tried to utilize pillows to keep the ankle off of the bed without complete success.  During the day, he has spends almost the entirety of the day in a lift chair.  Patient complains of tenderness.  There has been small amount of drainage.  There has been no fever or chills.         Current Assessment & Plan     Excisional debridement performed of nonviable tissue.  Begin Santyl daily.  I had a long discussion with the patient's wife regarding offloading the right ankle.  I have recommended either a multi Podus boot with a kickstand or a foam wrap around the foot and ankle at night with a depression cut out for the ankle to offload the right ankle.  The patient does not have the ability to self offload the right ankle.            Other    Pressure injury of sacral region, stage 2    Overview     79-year-old gentleman with Parkinson's disease and Parkinson's associated dementia with associated debilitation who was developed a wound on his sacrum and bilateral buttocks.  This is been present for at least 3 weeks prior to wound clinic visit on 04/20/2020. During the  day, he has spends almost the entirety of the day in a lift chair.  The patient is not able to turn himself due to his debilitation.  He currently has a wheelchair on order with appropriate cushions for offloading.  Patient's wife has been applying cream.  Patient denies tenderness.  There has been scant drainage.  There has been no fever or chills.           Current Assessment & Plan     Excisional debridement performed into dermis of nonviable tissue.  Patient's lift chair is most likely the culprit for the sacral and buttock wounds.  I have asked the patient's wife to limit sitting in the lift chair to 2 hr and only at times of eating.  Otherwise the patient will need to be in bed but care will have to be taken to ensure he does not apply pressure to the ankle.         Pressure injury of left buttock, stage 2    Overview     79-year-old gentleman with Parkinson's disease and Parkinson's associated dementia with associated debilitation who was developed a wound on his right lateral ankle.  This is been present for 2-3 months prior to wound clinic visit on 04/20/2020.  The patient did see Dr. Crocker with Podiatry and was subsequently referred here for evaluation.  According to his wife, he always rolled on his right side to sleep.  As a consequence, he constantly has pressure on the right ankle when sleeping period she has tried to utilize pillows to keep the ankle off of the bed without complete success.  During the day, he has spends almost the entirety of the day in a lift chair.  Patient complains of tenderness.  There has been small amount of drainage.  There has been no fever or chills.           Current Assessment & Plan     Excisional debridement performed into dermis of nonviable tissue.  Patient's lift chair is most likely the culprit for the sacral and buttock wounds.  I have asked the patient's wife to limit sitting in the lift chair to 2 hr and only at times of eating.  Otherwise the patient will need  to be in bed but care will have to be taken to ensure he does not apply pressure to the ankle.         Pressure injury of right buttock, stage 2    Overview     79-year-old gentleman with Parkinson's disease and Parkinson's associated dementia with associated debilitation who was developed a wound on his right lateral ankle.  This is been present for 2-3 months prior to wound clinic visit on 04/20/2020.  The patient did see Dr. Crocker with Podiatry and was subsequently referred here for evaluation.  According to his wife, he always rolled on his right side to sleep.  As a consequence, he constantly has pressure on the right ankle when sleeping period she has tried to utilize pillows to keep the ankle off of the bed without complete success.  During the day, he has spends almost the entirety of the day in a lift chair.  Patient complains of tenderness.  There has been small amount of drainage.  There has been no fever or chills.           Current Assessment & Plan     Excisional debridement performed into dermis of nonviable tissue.  Patient's lift chair is most likely the culprit for the sacral and buttock wounds.  I have asked the patient's wife to limit sitting in the lift chair to 2 hr and only at times of eating.  Otherwise the patient will need to be in bed but care will have to be taken to ensure he does not apply pressure to the ankle.                 History:    Past Medical History:   Diagnosis Date    Cataract     Coronary artery disease     Dementia     Diabetes mellitus, type 2     Dyslipidemia     GERD (gastroesophageal reflux disease)     Gout     Hyperlipidemia     Hypertension     Parkinson disease     Venous insufficiency        Past Surgical History:   Procedure Laterality Date    BACK SURGERY      double bypass      EYE SURGERY      cataract removal       Family History   Problem Relation Age of Onset    Diabetes Mother     Cancer Mother     Cancer Father     Cancer Sister      Stroke Brother         reports that he has never smoked. He has quit using smokeless tobacco.  His smokeless tobacco use included chew. He reports that he does not drink alcohol or use drugs.    has a current medication list which includes the following prescription(s): allopurinol, aspirin, calcium carbonate, carbidopa-levodopa  mg, clopidogrel, finasteride, fluocinonide, gabapentin, isosorbide mononitrate, ketoconazole, ketoconazole, memantine, metformin, metoprolol succinate, nitroglycerin, omeprazole, pimavanserin, restasis, simvastatin, sulfamethoxazole-trimethoprim 400-80mg, tamsulosin, tizanidine, venlafaxine, and vitamin d.    Allergies:  Codeine    Review of Systems:  Review of Systems   Constitutional: Negative for chills, fever and weight loss.   HENT: Negative for nosebleeds.    Eyes: Negative for photophobia and pain.   Respiratory: Negative for cough, hemoptysis, wheezing and stridor.    Cardiovascular: Negative for chest pain, palpitations, orthopnea and leg swelling.   Gastrointestinal: Negative for abdominal pain, nausea and vomiting.   Genitourinary: Negative for flank pain and hematuria.   Musculoskeletal: Positive for back pain. Negative for neck pain.   Skin: Negative for itching and rash.   Neurological: Positive for weakness. Negative for seizures.        Patient has limited mobility and walks infrequently due to parkinson's disease.   Psychiatric/Behavioral: Positive for hallucinations. Negative for suicidal ideas.         There were no vitals filed for this visit.      BMI:  There is no height or weight on file to calculate BMI.    Physical Exam:  Physical Exam   Constitutional: No distress.   Chronically ill appearing   HENT:   Head: Normocephalic and atraumatic.   Eyes: Pupils are equal, round, and reactive to light. EOM are normal. No scleral icterus.   Neck: Normal range of motion. Neck supple. No JVD present. No tracheal deviation present. No thyromegaly present.   Cardiovascular:  Normal rate and regular rhythm.   I cannot identify a right dp or pt pulse.\  Bounding left dp pulse is identified.   Pulmonary/Chest: Effort normal and breath sounds normal. No stridor. No respiratory distress. He has no wheezes.   Abdominal: Soft. He exhibits no distension.   Musculoskeletal: He exhibits no edema or deformity.   See wound progress note for detailed wound description.     Lymphadenopathy:     He has no cervical adenopathy.   Neurological: He is alert. He exhibits abnormal muscle tone.   When lying in the bed, the patient turns right foot laterally placing pressure on right ankle.   Skin: Skin is warm and dry.   Psychiatric:   Flat effect       A1C:  No results for input(s): HGBA1C in the last 2160 hours.  BMP:  Recent Labs   Lab Result Units 02/24/20  1255   Glucose mg/dL 110*   Sodium mmol/L 137   Potassium mmol/L 4.5   Chloride mmol/L 100   CO2 mmol/L 29   BUN, Bld mg/dL 26*   Creatinine mg/dL 0.90   Calcium mg/dL 9.4      CBC:  Recent Labs   Lab Result Units 02/24/20  1255   WBC K/uL 12.40   RBC M/uL 4.31*   Hemoglobin g/dL 12.7*   Hematocrit % 38.7*   Platelets K/uL 211   Mean Corpuscular Volume fL 90   Mean Corpuscular Hemoglobin pg 29.4   Mean Corpuscular Hemoglobin Conc g/dL 32.8     CMP:  Recent Labs   Lab Result Units 02/24/20  1255   Glucose mg/dL 110*   Calcium mg/dL 9.4   Albumin g/dL 4.1   Total Protein g/dL 7.5   Sodium mmol/L 137   Potassium mmol/L 4.5   CO2 mmol/L 29   Chloride mmol/L 100   BUN, Bld mg/dL 26*   Alkaline Phosphatase U/L 67   ALT U/L 6*   AST U/L 16*   Total Bilirubin mg/dL 0.4     PREALBUMIN:  No results for input(s): PREALBUMIN in the last 2160 hours.  WOUND CULTURES:  No results for input(s): LABAERO in the last 2160 hours.        Plan:  See Wound Docs note for plan and follow up.        Jose CASTRO Hebert [facility]

## 2020-04-20 NOTE — ASSESSMENT & PLAN NOTE
Excisional debridement performed into dermis of nonviable tissue.  Patient's lift chair is most likely the culprit for the sacral and buttock wounds.  I have asked the patient's wife to limit sitting in the lift chair to 2 hr and only at times of eating.  Otherwise the patient will need to be in bed but care will have to be taken to ensure he does not apply pressure to the ankle.

## 2020-04-27 NOTE — ASSESSMENT & PLAN NOTE
Wound remains clean.  There is minimal amount of slough.  This was easily removed with a curette.  There is no sign of infection.  Continue offloading and Santyl.  Consider collagen in the near future.

## 2020-04-27 NOTE — PROGRESS NOTES
[facility]  Wound Care  Progress Note    Problem List Items Addressed This Visit     Pressure ulcer of right ankle, stage 3    Overview     79-year-old gentleman with Parkinson's disease and Parkinson's associated dementia with associated debilitation who was developed a wound on his right lateral ankle.  This is been present for 2-3 months prior to wound clinic visit on 04/20/2020.  The patient did see Dr. Crocker with Podiatry and was subsequently referred here for evaluation.  According to his wife, he always rolled on his right side to sleep.  As a consequence, he constantly has pressure on the right ankle when sleeping period she has tried to utilize pillows to keep the ankle off of the bed without complete success.  During the day, he has spends almost the entirety of the day in a lift chair.  Patient complains of tenderness.  There has been small amount of drainage.  There has been no fever or chills.         Current Assessment & Plan     Wound remains clean.  There is minimal amount of slough.  This was easily removed with a curette.  There is no sign of infection.  Continue offloading and Santyl.  Consider collagen in the near future.         Pressure injury of sacral region, stage 2    Overview     79-year-old gentleman with Parkinson's disease and Parkinson's associated dementia with associated debilitation who was developed a wound on his sacrum and bilateral buttocks.  This is been present for at least 3 weeks prior to wound clinic visit on 04/20/2020. During the day, he has spends almost the entirety of the day in a lift chair.  The patient is not able to turn himself due to his debilitation.  He currently has a wheelchair on order with appropriate cushions for offloading.  Patient's wife has been applying cream.  Patient denies tenderness.  There has been scant drainage.  There has been no fever or chills.           Current Assessment & Plan     Wounds appear resolved.  Continue  Calmoseptine and offloading.         Pressure injury of left buttock, stage 2    Overview     79-year-old gentleman with Parkinson's disease and Parkinson's associated dementia with associated debilitation who was developed a wound on his right lateral ankle.  This is been present for 2-3 months prior to wound clinic visit on 04/20/2020.  The patient did see Dr. Crocker with Podiatry and was subsequently referred here for evaluation.  According to his wife, he always rolled on his right side to sleep.  As a consequence, he constantly has pressure on the right ankle when sleeping period she has tried to utilize pillows to keep the ankle off of the bed without complete success.  During the day, he has spends almost the entirety of the day in a lift chair.  Patient complains of tenderness.  There has been small amount of drainage.  There has been no fever or chills.           Current Assessment & Plan     Wound appears resolved.  Continue Calmoseptine and offloading.         Pressure injury of right buttock, stage 2    Overview     79-year-old gentleman with Parkinson's disease and Parkinson's associated dementia with associated debilitation who was developed a wound on his right lateral ankle.  This is been present for 2-3 months prior to wound clinic visit on 04/20/2020.  The patient did see Dr. Crocker with Podiatry and was subsequently referred here for evaluation.  According to his wife, he always rolled on his right side to sleep.  As a consequence, he constantly has pressure on the right ankle when sleeping period she has tried to utilize pillows to keep the ankle off of the bed without complete success.  During the day, he has spends almost the entirety of the day in a lift chair.  Patient complains of tenderness.  There has been small amount of drainage.  There has been no fever or chills.           Current Assessment & Plan     Wound appears resolved.  Continue Calmoseptine and offloading.               See  wound doc progress notes. Documents will be scanned.        Marek LOMELI Lawson  [facility]

## 2020-04-27 NOTE — PROGRESS NOTES
[facility]  Wound Care  Progress Note    Problem List Items Addressed This Visit     Pressure ulcer of right ankle, stage 3    Overview     79-year-old gentleman with Parkinson's disease and Parkinson's associated dementia with associated debilitation who was developed a wound on his right lateral ankle.  This is been present for 2-3 months prior to wound clinic visit on 04/20/2020.  The patient did see Dr. Crocker with Podiatry and was subsequently referred here for evaluation.  According to his wife, he always rolled on his right side to sleep.  As a consequence, he constantly has pressure on the right ankle when sleeping period she has tried to utilize pillows to keep the ankle off of the bed without complete success.  During the day, he has spends almost the entirety of the day in a lift chair.  Patient complains of tenderness.  There has been small amount of drainage.  There has been no fever or chills.         Current Assessment & Plan     Wound remains clean.  There is minimal amount of slough.  This was easily removed with a curette.  There is no sign of infection.  Continue offloading and Santyl.  Consider collagen in the near future.         Pressure injury of sacral region, stage 2    Overview     79-year-old gentleman with Parkinson's disease and Parkinson's associated dementia with associated debilitation who was developed a wound on his sacrum and bilateral buttocks.  This is been present for at least 3 weeks prior to wound clinic visit on 04/20/2020. During the day, he has spends almost the entirety of the day in a lift chair.  The patient is not able to turn himself due to his debilitation.  He currently has a wheelchair on order with appropriate cushions for offloading.  Patient's wife has been applying cream.  Patient denies tenderness.  There has been scant drainage.  There has been no fever or chills.           Current Assessment & Plan     Wounds appear resolved.  Continue  Calmoseptine and offloading.         Pressure injury of left buttock, stage 2    Overview     79-year-old gentleman with Parkinson's disease and Parkinson's associated dementia with associated debilitation who was developed a wound on his right lateral ankle.  This is been present for 2-3 months prior to wound clinic visit on 04/20/2020.  The patient did see Dr. Crocker with Podiatry and was subsequently referred here for evaluation.  According to his wife, he always rolled on his right side to sleep.  As a consequence, he constantly has pressure on the right ankle when sleeping period she has tried to utilize pillows to keep the ankle off of the bed without complete success.  During the day, he has spends almost the entirety of the day in a lift chair.  Patient complains of tenderness.  There has been small amount of drainage.  There has been no fever or chills.           Current Assessment & Plan     Wound appears resolved.  Continue Calmoseptine and offloading.               See wound doc progress notes. Documents will be scanned.        Marek LOMELI Lawson  [facility]

## 2020-05-11 NOTE — ASSESSMENT & PLAN NOTE
Wound is clean and granulating.  Scant amount of slough present.  Continued sharp debridement is needed.  Continue offloading.

## 2020-05-11 NOTE — PROGRESS NOTES
[facility]  Wound Care  Progress Note    Problem List Items Addressed This Visit     Pressure ulcer of right ankle, stage 3    Overview     79-year-old gentleman with Parkinson's disease and Parkinson's associated dementia with associated debilitation who was developed a wound on his right lateral ankle.  This is been present for 2-3 months prior to wound clinic visit on 04/20/2020.  The patient did see Dr. Crocker with Podiatry and was subsequently referred here for evaluation.  According to his wife, he always rolled on his right side to sleep.  As a consequence, he constantly has pressure on the right ankle when sleeping period she has tried to utilize pillows to keep the ankle off of the bed without complete success.  During the day, he has spends almost the entirety of the day in a lift chair.  Patient complains of tenderness.  There has been small amount of drainage.  There has been no fever or chills.         Current Assessment & Plan     Wound is clean and granulating.  Scant amount of slough present.  Continued sharp debridement is needed.  Continue offloading.               See wound doc progress notes. Documents will be scanned.        Marek Lawson  [facility]

## 2021-01-01 NOTE — TELEPHONE ENCOUNTER
----- Message from Rohit Mcgill sent at 6/5/2019 10:02 AM CDT -----  Contact: Alberta ( spouse ) @ [phone]  Caller calling to schedule a f/u appt before Dr any pls call    Name band;

## 2024-12-08 NOTE — PATIENT INSTRUCTIONS
START QUETIAPINE 25 MG (HALF A PILL) AT BEDTIME-     2 WEEKS LATER    INCREASE STALEVO TO FULL TABLET IN AFTERNOON   Patient is a 73y old  Male who presents with a chief complaint of SOB (07 Dec 2024 12:50)      SUBJECTIVE / OVERNIGHT EVENTS: no events     T(C): 36.4 (12-08-24 @ 12:11), Max: 36.5 (12-08-24 @ 05:10)  HR: 72 (12-08-24 @ 12:11) (70 - 72)  BP: 102/50 (12-08-24 @ 12:11) (92/57 - 102/50)  RR: 18 (12-08-24 @ 12:11) (18 - 18)  SpO2: 98% (12-08-24 @ 12:11) (96% - 98%)    MEDICATIONS  (STANDING):  apixaban 5 milliGRAM(s) Oral every 12 hours  atorvastatin 40 milliGRAM(s) Oral at bedtime  colchicine 0.6 milliGRAM(s) Oral daily  finasteride 5 milliGRAM(s) Oral daily  levothyroxine 175 MICROGram(s) Oral daily  sacubitril 24 mG/valsartan 26 mG 1 Tablet(s) Oral two times a day  tamsulosin 0.4 milliGRAM(s) Oral at bedtime    MEDICATIONS  (PRN):    PHYSICAL EXAM:  GENERAL: NAD, well-developed  HEAD:  Atraumatic, Normocephalic  EYES: EOMI, PERRLA, conjunctiva and sclera clear  NECK: Supple, No JVD  CHEST/LUNG: Clear to auscultation bilaterally; No wheeze  HEART: Regular rate and rhythm; No murmurs, rubs, or gallops  ABDOMEN: Soft, Nontender, Nondistended; Bowel sounds present  EXTREMITIES:  2+ Peripheral Pulses, No clubbing, cyanosis, or edema  PSYCH: AAOx3  NEUROLOGY: non-focal  SKIN: No rashes or lesions    LABS:                        10.9   3.18  )-----------( 146      ( 07 Dec 2024 06:44 )             33.8     12-07    140  |  103  |  71[H]  ----------------------------<  89  3.9   |  23  |  2.30[H]    Ca    8.5      07 Dec 2024 06:44  Phos  3.8     12-07  Mg     1.8     12-07    TPro  6.4  /  Alb  3.7  /  TBili  1.0  /  DBili  x   /  AST  46[H]  /  ALT  36  /  AlkPhos  111  12-06    PT/INR - ( 06 Dec 2024 01:39 )   PT: 19.8 sec;   INR: 1.73 ratio         PTT - ( 06 Dec 2024 01:39 )  PTT:35.9 sec      Urinalysis Basic - ( 07 Dec 2024 06:44 )    Color: x / Appearance: x / SG: x / pH: x  Gluc: 89 mg/dL / Ketone: x  / Bili: x / Urobili: x   Blood: x / Protein: x / Nitrite: x   Leuk Esterase: x / RBC: x / WBC x   Sq Epi: x / Non Sq Epi: x / Bacteria: x        RADIOLOGY & ADDITIONAL TESTS:    Imaging Personally Reviewed:    Consultant(s) Notes Reviewed:      Care Discussed with Consultants/Other Providers: